# Patient Record
Sex: FEMALE | Race: WHITE | NOT HISPANIC OR LATINO
[De-identification: names, ages, dates, MRNs, and addresses within clinical notes are randomized per-mention and may not be internally consistent; named-entity substitution may affect disease eponyms.]

---

## 2019-09-24 ENCOUNTER — APPOINTMENT (OUTPATIENT)
Dept: OPHTHALMOLOGY | Facility: CLINIC | Age: 74
End: 2019-09-24
Payer: MEDICARE

## 2019-09-24 ENCOUNTER — NON-APPOINTMENT (OUTPATIENT)
Age: 74
End: 2019-09-24

## 2019-09-24 PROCEDURE — 92002 INTRM OPH EXAM NEW PATIENT: CPT

## 2019-09-24 PROCEDURE — 92012 INTRM OPH EXAM EST PATIENT: CPT

## 2020-03-03 ENCOUNTER — APPOINTMENT (OUTPATIENT)
Dept: OPHTHALMOLOGY | Facility: CLINIC | Age: 75
End: 2020-03-03
Payer: MEDICARE

## 2020-03-03 ENCOUNTER — NON-APPOINTMENT (OUTPATIENT)
Age: 75
End: 2020-03-03

## 2020-03-03 PROCEDURE — 92012 INTRM OPH EXAM EST PATIENT: CPT

## 2020-03-07 ENCOUNTER — TRANSCRIPTION ENCOUNTER (OUTPATIENT)
Age: 75
End: 2020-03-07

## 2020-08-21 PROBLEM — Z00.00 ENCOUNTER FOR PREVENTIVE HEALTH EXAMINATION: Status: ACTIVE | Noted: 2020-08-21

## 2020-08-25 ENCOUNTER — NON-APPOINTMENT (OUTPATIENT)
Age: 75
End: 2020-08-25

## 2020-08-25 ENCOUNTER — APPOINTMENT (OUTPATIENT)
Dept: OPHTHALMOLOGY | Facility: CLINIC | Age: 75
End: 2020-08-25
Payer: MEDICARE

## 2020-08-25 PROCEDURE — 92012 INTRM OPH EXAM EST PATIENT: CPT

## 2020-10-07 ENCOUNTER — APPOINTMENT (OUTPATIENT)
Dept: OPHTHALMOLOGY | Facility: CLINIC | Age: 75
End: 2020-10-07
Payer: MEDICARE

## 2020-10-07 ENCOUNTER — NON-APPOINTMENT (OUTPATIENT)
Age: 75
End: 2020-10-07

## 2020-10-07 PROCEDURE — 92014 COMPRE OPH EXAM EST PT 1/>: CPT

## 2020-11-22 ENCOUNTER — TRANSCRIPTION ENCOUNTER (OUTPATIENT)
Age: 75
End: 2020-11-22

## 2020-11-23 ENCOUNTER — OUTPATIENT (OUTPATIENT)
Dept: OUTPATIENT SERVICES | Facility: HOSPITAL | Age: 75
LOS: 1 days | Discharge: HOME | End: 2020-11-23
Payer: MEDICARE

## 2020-11-23 ENCOUNTER — APPOINTMENT (OUTPATIENT)
Dept: OPHTHALMOLOGY | Facility: AMBULATORY SURGERY CENTER | Age: 75
End: 2020-11-23

## 2020-11-23 ENCOUNTER — NON-APPOINTMENT (OUTPATIENT)
Age: 75
End: 2020-11-23

## 2020-11-23 PROCEDURE — 66984 XCAPSL CTRC RMVL W/O ECP: CPT | Mod: RT

## 2020-11-24 ENCOUNTER — NON-APPOINTMENT (OUTPATIENT)
Age: 75
End: 2020-11-24

## 2020-11-24 ENCOUNTER — APPOINTMENT (OUTPATIENT)
Dept: OPHTHALMOLOGY | Facility: CLINIC | Age: 75
End: 2020-11-24
Payer: MEDICARE

## 2020-11-24 PROCEDURE — 99024 POSTOP FOLLOW-UP VISIT: CPT

## 2020-12-01 ENCOUNTER — APPOINTMENT (OUTPATIENT)
Dept: OPHTHALMOLOGY | Facility: CLINIC | Age: 75
End: 2020-12-01
Payer: MEDICARE

## 2020-12-01 ENCOUNTER — NON-APPOINTMENT (OUTPATIENT)
Age: 75
End: 2020-12-01

## 2020-12-01 PROCEDURE — 99024 POSTOP FOLLOW-UP VISIT: CPT

## 2020-12-22 ENCOUNTER — APPOINTMENT (OUTPATIENT)
Dept: OPHTHALMOLOGY | Facility: CLINIC | Age: 75
End: 2020-12-22
Payer: MEDICARE

## 2020-12-22 ENCOUNTER — NON-APPOINTMENT (OUTPATIENT)
Age: 75
End: 2020-12-22

## 2020-12-22 PROCEDURE — 99024 POSTOP FOLLOW-UP VISIT: CPT

## 2021-02-10 ENCOUNTER — NON-APPOINTMENT (OUTPATIENT)
Age: 76
End: 2021-02-10

## 2021-02-10 ENCOUNTER — APPOINTMENT (OUTPATIENT)
Dept: OPHTHALMOLOGY | Facility: CLINIC | Age: 76
End: 2021-02-10
Payer: MEDICARE

## 2021-02-10 PROCEDURE — 99024 POSTOP FOLLOW-UP VISIT: CPT

## 2021-05-12 ENCOUNTER — APPOINTMENT (OUTPATIENT)
Dept: OPHTHALMOLOGY | Facility: CLINIC | Age: 76
End: 2021-05-12
Payer: MEDICARE

## 2021-05-12 ENCOUNTER — NON-APPOINTMENT (OUTPATIENT)
Age: 76
End: 2021-05-12

## 2021-05-12 PROCEDURE — 92012 INTRM OPH EXAM EST PATIENT: CPT

## 2021-09-28 ENCOUNTER — APPOINTMENT (OUTPATIENT)
Dept: OPHTHALMOLOGY | Facility: CLINIC | Age: 76
End: 2021-09-28
Payer: MEDICARE

## 2021-09-28 ENCOUNTER — NON-APPOINTMENT (OUTPATIENT)
Age: 76
End: 2021-09-28

## 2021-09-28 PROCEDURE — 92012 INTRM OPH EXAM EST PATIENT: CPT

## 2021-10-05 ENCOUNTER — APPOINTMENT (OUTPATIENT)
Dept: OPHTHALMOLOGY | Facility: CLINIC | Age: 76
End: 2021-10-05
Payer: MEDICARE

## 2021-10-05 ENCOUNTER — NON-APPOINTMENT (OUTPATIENT)
Age: 76
End: 2021-10-05

## 2021-10-05 PROCEDURE — 99212 OFFICE O/P EST SF 10 MIN: CPT

## 2021-10-12 ENCOUNTER — NON-APPOINTMENT (OUTPATIENT)
Age: 76
End: 2021-10-12

## 2021-10-12 ENCOUNTER — APPOINTMENT (OUTPATIENT)
Dept: OPHTHALMOLOGY | Facility: CLINIC | Age: 76
End: 2021-10-12
Payer: MEDICARE

## 2021-10-12 PROCEDURE — 92012 INTRM OPH EXAM EST PATIENT: CPT

## 2021-10-19 ENCOUNTER — NON-APPOINTMENT (OUTPATIENT)
Age: 76
End: 2021-10-19

## 2021-10-19 ENCOUNTER — APPOINTMENT (OUTPATIENT)
Dept: OPHTHALMOLOGY | Facility: CLINIC | Age: 76
End: 2021-10-19
Payer: MEDICARE

## 2021-10-19 PROCEDURE — 92012 INTRM OPH EXAM EST PATIENT: CPT

## 2021-10-27 ENCOUNTER — NON-APPOINTMENT (OUTPATIENT)
Age: 76
End: 2021-10-27

## 2021-10-27 ENCOUNTER — APPOINTMENT (OUTPATIENT)
Dept: OPHTHALMOLOGY | Facility: CLINIC | Age: 76
End: 2021-10-27
Payer: MEDICARE

## 2021-10-27 PROCEDURE — 92012 INTRM OPH EXAM EST PATIENT: CPT | Mod: 57

## 2021-10-27 PROCEDURE — 67028 INJECTION EYE DRUG: CPT | Mod: LT

## 2021-10-27 PROCEDURE — 76512 OPH US DX B-SCAN: CPT | Mod: LT

## 2021-10-27 PROCEDURE — 92012 INTRM OPH EXAM EST PATIENT: CPT | Mod: 25

## 2021-10-29 ENCOUNTER — NON-APPOINTMENT (OUTPATIENT)
Age: 76
End: 2021-10-29

## 2021-10-29 ENCOUNTER — APPOINTMENT (OUTPATIENT)
Dept: OPHTHALMOLOGY | Facility: CLINIC | Age: 76
End: 2021-10-29
Payer: MEDICARE

## 2021-10-29 ENCOUNTER — OUTPATIENT (OUTPATIENT)
Dept: OUTPATIENT SERVICES | Facility: HOSPITAL | Age: 76
LOS: 1 days | End: 2021-10-29

## 2021-10-29 DIAGNOSIS — Z00.00 ENCOUNTER FOR GENERAL ADULT MEDICAL EXAMINATION WITHOUT ABNORMAL FINDINGS: ICD-10-CM

## 2021-10-29 PROCEDURE — 92012 INTRM OPH EXAM EST PATIENT: CPT

## 2021-10-31 ENCOUNTER — TRANSCRIPTION ENCOUNTER (OUTPATIENT)
Age: 76
End: 2021-10-31

## 2021-11-01 ENCOUNTER — NON-APPOINTMENT (OUTPATIENT)
Age: 76
End: 2021-11-01

## 2021-11-01 ENCOUNTER — APPOINTMENT (OUTPATIENT)
Dept: OPHTHALMOLOGY | Facility: AMBULATORY SURGERY CENTER | Age: 76
End: 2021-11-01

## 2021-11-01 ENCOUNTER — RESULT REVIEW (OUTPATIENT)
Age: 76
End: 2021-11-01

## 2021-11-01 ENCOUNTER — OUTPATIENT (OUTPATIENT)
Dept: OUTPATIENT SERVICES | Facility: HOSPITAL | Age: 76
LOS: 1 days | Discharge: HOME | End: 2021-11-01
Payer: MEDICARE

## 2021-11-01 LAB
GRAM STN FLD: SIGNIFICANT CHANGE UP
GRAM STN FLD: SIGNIFICANT CHANGE UP
SPECIMEN SOURCE: SIGNIFICANT CHANGE UP
SPECIMEN SOURCE: SIGNIFICANT CHANGE UP

## 2021-11-01 PROCEDURE — 88304 TISSUE EXAM BY PATHOLOGIST: CPT | Mod: 26

## 2021-11-01 PROCEDURE — 67028 INJECTION EYE DRUG: CPT | Mod: LT

## 2021-11-01 PROCEDURE — 65756 CORNEAL TRNSPL ENDOTHELIAL: CPT | Mod: LT

## 2021-11-02 ENCOUNTER — APPOINTMENT (OUTPATIENT)
Dept: OPHTHALMOLOGY | Facility: CLINIC | Age: 76
End: 2021-11-02
Payer: MEDICARE

## 2021-11-02 ENCOUNTER — APPOINTMENT (OUTPATIENT)
Dept: OPHTHALMOLOGY | Facility: CLINIC | Age: 76
End: 2021-11-02

## 2021-11-02 ENCOUNTER — NON-APPOINTMENT (OUTPATIENT)
Age: 76
End: 2021-11-02

## 2021-11-02 PROCEDURE — 99024 POSTOP FOLLOW-UP VISIT: CPT

## 2021-11-03 ENCOUNTER — NON-APPOINTMENT (OUTPATIENT)
Age: 76
End: 2021-11-03

## 2021-11-03 ENCOUNTER — APPOINTMENT (OUTPATIENT)
Dept: OPHTHALMOLOGY | Facility: CLINIC | Age: 76
End: 2021-11-03
Payer: MEDICARE

## 2021-11-03 PROCEDURE — 99024 POSTOP FOLLOW-UP VISIT: CPT

## 2021-11-09 ENCOUNTER — APPOINTMENT (OUTPATIENT)
Dept: OPHTHALMOLOGY | Facility: CLINIC | Age: 76
End: 2021-11-09
Payer: MEDICARE

## 2021-11-09 ENCOUNTER — NON-APPOINTMENT (OUTPATIENT)
Age: 76
End: 2021-11-09

## 2021-11-09 PROCEDURE — 99024 POSTOP FOLLOW-UP VISIT: CPT

## 2021-11-10 ENCOUNTER — APPOINTMENT (OUTPATIENT)
Dept: OPHTHALMOLOGY | Facility: CLINIC | Age: 76
End: 2021-11-10

## 2021-11-15 LAB — SURGICAL PATHOLOGY STUDY: SIGNIFICANT CHANGE UP

## 2021-11-16 ENCOUNTER — NON-APPOINTMENT (OUTPATIENT)
Age: 76
End: 2021-11-16

## 2021-11-16 ENCOUNTER — APPOINTMENT (OUTPATIENT)
Dept: OPHTHALMOLOGY | Facility: CLINIC | Age: 76
End: 2021-11-16
Payer: MEDICARE

## 2021-11-16 PROCEDURE — 92285 EXTERNAL OCULAR PHOTOGRAPHY: CPT

## 2021-11-16 PROCEDURE — 99024 POSTOP FOLLOW-UP VISIT: CPT

## 2021-11-21 LAB
CULTURE RESULTS: NO GROWTH — SIGNIFICANT CHANGE UP
SPECIMEN SOURCE: SIGNIFICANT CHANGE UP

## 2021-11-22 LAB
CULTURE RESULTS: NO GROWTH — SIGNIFICANT CHANGE UP
SPECIMEN SOURCE: SIGNIFICANT CHANGE UP

## 2021-11-30 ENCOUNTER — NON-APPOINTMENT (OUTPATIENT)
Age: 76
End: 2021-11-30

## 2021-11-30 ENCOUNTER — APPOINTMENT (OUTPATIENT)
Dept: OPHTHALMOLOGY | Facility: CLINIC | Age: 76
End: 2021-11-30
Payer: MEDICARE

## 2021-11-30 PROCEDURE — 99024 POSTOP FOLLOW-UP VISIT: CPT

## 2021-12-14 ENCOUNTER — APPOINTMENT (OUTPATIENT)
Dept: OPHTHALMOLOGY | Facility: CLINIC | Age: 76
End: 2021-12-14
Payer: MEDICARE

## 2021-12-14 ENCOUNTER — NON-APPOINTMENT (OUTPATIENT)
Age: 76
End: 2021-12-14

## 2021-12-14 PROCEDURE — 99024 POSTOP FOLLOW-UP VISIT: CPT

## 2021-12-28 ENCOUNTER — APPOINTMENT (OUTPATIENT)
Dept: OPHTHALMOLOGY | Facility: CLINIC | Age: 76
End: 2021-12-28
Payer: MEDICARE

## 2021-12-28 ENCOUNTER — NON-APPOINTMENT (OUTPATIENT)
Age: 76
End: 2021-12-28

## 2021-12-28 PROCEDURE — 99024 POSTOP FOLLOW-UP VISIT: CPT

## 2022-01-18 ENCOUNTER — NON-APPOINTMENT (OUTPATIENT)
Age: 77
End: 2022-01-18

## 2022-01-18 ENCOUNTER — APPOINTMENT (OUTPATIENT)
Dept: OPHTHALMOLOGY | Facility: CLINIC | Age: 77
End: 2022-01-18
Payer: MEDICARE

## 2022-01-18 PROCEDURE — 99024 POSTOP FOLLOW-UP VISIT: CPT

## 2022-02-01 ENCOUNTER — APPOINTMENT (OUTPATIENT)
Dept: OPHTHALMOLOGY | Facility: CLINIC | Age: 77
End: 2022-02-01
Payer: MEDICARE

## 2022-02-01 ENCOUNTER — NON-APPOINTMENT (OUTPATIENT)
Age: 77
End: 2022-02-01

## 2022-02-01 PROCEDURE — 99212 OFFICE O/P EST SF 10 MIN: CPT

## 2022-02-16 ENCOUNTER — APPOINTMENT (OUTPATIENT)
Dept: OPHTHALMOLOGY | Facility: CLINIC | Age: 77
End: 2022-02-16
Payer: MEDICARE

## 2022-02-16 ENCOUNTER — NON-APPOINTMENT (OUTPATIENT)
Age: 77
End: 2022-02-16

## 2022-02-16 LAB
GRAM STN FLD: SIGNIFICANT CHANGE UP
SPECIMEN SOURCE: SIGNIFICANT CHANGE UP

## 2022-02-16 PROCEDURE — 65430 CORNEAL SMEAR: CPT | Mod: LT

## 2022-02-16 PROCEDURE — 92012 INTRM OPH EXAM EST PATIENT: CPT | Mod: 25

## 2022-02-17 ENCOUNTER — NON-APPOINTMENT (OUTPATIENT)
Age: 77
End: 2022-02-17

## 2022-02-17 ENCOUNTER — APPOINTMENT (OUTPATIENT)
Dept: OPHTHALMOLOGY | Facility: CLINIC | Age: 77
End: 2022-02-17
Payer: MEDICARE

## 2022-02-17 PROCEDURE — 92012 INTRM OPH EXAM EST PATIENT: CPT | Mod: 25

## 2022-02-17 PROCEDURE — 67820 REVISE EYELASHES: CPT | Mod: LT

## 2022-02-18 LAB
-  CEFTRIAXONE: SIGNIFICANT CHANGE UP
-  CLINDAMYCIN: SIGNIFICANT CHANGE UP
-  ERYTHROMYCIN: SIGNIFICANT CHANGE UP
-  LEVOFLOXACIN: SIGNIFICANT CHANGE UP
-  PENICILLIN: SIGNIFICANT CHANGE UP
-  VANCOMYCIN: SIGNIFICANT CHANGE UP
CULTURE RESULTS: SIGNIFICANT CHANGE UP
METHOD TYPE: SIGNIFICANT CHANGE UP
METHOD TYPE: SIGNIFICANT CHANGE UP
ORGANISM # SPEC MICROSCOPIC CNT: SIGNIFICANT CHANGE UP
SPECIMEN SOURCE: SIGNIFICANT CHANGE UP

## 2022-02-22 ENCOUNTER — APPOINTMENT (OUTPATIENT)
Dept: OPHTHALMOLOGY | Facility: CLINIC | Age: 77
End: 2022-02-22
Payer: MEDICARE

## 2022-02-22 ENCOUNTER — NON-APPOINTMENT (OUTPATIENT)
Age: 77
End: 2022-02-22

## 2022-02-22 PROCEDURE — 67028 INJECTION EYE DRUG: CPT | Mod: LT,59

## 2022-02-22 PROCEDURE — 92012 INTRM OPH EXAM EST PATIENT: CPT

## 2022-02-22 PROCEDURE — 65800 DRAINAGE OF EYE: CPT | Mod: LT

## 2022-02-23 ENCOUNTER — APPOINTMENT (OUTPATIENT)
Dept: OPHTHALMOLOGY | Facility: CLINIC | Age: 77
End: 2022-02-23
Payer: MEDICARE

## 2022-02-23 ENCOUNTER — NON-APPOINTMENT (OUTPATIENT)
Age: 77
End: 2022-02-23

## 2022-02-23 PROCEDURE — 92012 INTRM OPH EXAM EST PATIENT: CPT

## 2022-02-25 ENCOUNTER — APPOINTMENT (OUTPATIENT)
Dept: OPHTHALMOLOGY | Facility: CLINIC | Age: 77
End: 2022-02-25
Payer: MEDICARE

## 2022-02-25 ENCOUNTER — NON-APPOINTMENT (OUTPATIENT)
Age: 77
End: 2022-02-25

## 2022-02-25 PROCEDURE — 92012 INTRM OPH EXAM EST PATIENT: CPT

## 2022-03-01 ENCOUNTER — NON-APPOINTMENT (OUTPATIENT)
Age: 77
End: 2022-03-01

## 2022-03-01 ENCOUNTER — APPOINTMENT (OUTPATIENT)
Dept: OPHTHALMOLOGY | Facility: CLINIC | Age: 77
End: 2022-03-01
Payer: MEDICARE

## 2022-03-01 PROCEDURE — 92012 INTRM OPH EXAM EST PATIENT: CPT

## 2022-03-04 ENCOUNTER — NON-APPOINTMENT (OUTPATIENT)
Age: 77
End: 2022-03-04

## 2022-03-04 ENCOUNTER — APPOINTMENT (OUTPATIENT)
Dept: OPHTHALMOLOGY | Facility: CLINIC | Age: 77
End: 2022-03-04
Payer: MEDICARE

## 2022-03-04 PROCEDURE — 92012 INTRM OPH EXAM EST PATIENT: CPT

## 2022-03-08 ENCOUNTER — NON-APPOINTMENT (OUTPATIENT)
Age: 77
End: 2022-03-08

## 2022-03-08 ENCOUNTER — APPOINTMENT (OUTPATIENT)
Dept: OPHTHALMOLOGY | Facility: CLINIC | Age: 77
End: 2022-03-08
Payer: MEDICARE

## 2022-03-08 PROCEDURE — 92012 INTRM OPH EXAM EST PATIENT: CPT

## 2022-03-11 ENCOUNTER — APPOINTMENT (OUTPATIENT)
Dept: OPHTHALMOLOGY | Facility: CLINIC | Age: 77
End: 2022-03-11
Payer: MEDICARE

## 2022-03-11 ENCOUNTER — NON-APPOINTMENT (OUTPATIENT)
Age: 77
End: 2022-03-11

## 2022-03-11 PROCEDURE — 92012 INTRM OPH EXAM EST PATIENT: CPT

## 2022-03-15 ENCOUNTER — NON-APPOINTMENT (OUTPATIENT)
Age: 77
End: 2022-03-15

## 2022-03-15 ENCOUNTER — APPOINTMENT (OUTPATIENT)
Dept: OPHTHALMOLOGY | Facility: CLINIC | Age: 77
End: 2022-03-15
Payer: MEDICARE

## 2022-03-15 PROCEDURE — 92012 INTRM OPH EXAM EST PATIENT: CPT

## 2022-03-17 ENCOUNTER — APPOINTMENT (OUTPATIENT)
Dept: OPHTHALMOLOGY | Facility: CLINIC | Age: 77
End: 2022-03-17
Payer: MEDICARE

## 2022-03-17 ENCOUNTER — NON-APPOINTMENT (OUTPATIENT)
Age: 77
End: 2022-03-17

## 2022-03-17 ENCOUNTER — OUTPATIENT (OUTPATIENT)
Dept: OUTPATIENT SERVICES | Facility: HOSPITAL | Age: 77
LOS: 1 days | End: 2022-03-17

## 2022-03-17 DIAGNOSIS — Z00.00 ENCOUNTER FOR GENERAL ADULT MEDICAL EXAMINATION WITHOUT ABNORMAL FINDINGS: ICD-10-CM

## 2022-03-17 PROCEDURE — 92014 COMPRE OPH EXAM EST PT 1/>: CPT

## 2022-03-17 PROCEDURE — 92134 CPTRZ OPH DX IMG PST SGM RTA: CPT

## 2022-03-17 PROCEDURE — 76512 OPH US DX B-SCAN: CPT | Mod: LT

## 2022-03-18 NOTE — BRIEF OPERATIVE NOTE - NSICDXBRIEFPREOP_GEN_ALL_CORE_FT
PRE-OP DIAGNOSIS:  Endophthalmitis, left eye 18-Mar-2022 15:31:33  Cintia Pulido   PRE-OP DIAGNOSIS:  Endophthalmitis, left eye 18-Mar-2022 15:31:33  Cintia Pulido  Retained lens material, left 21-Mar-2022 16:16:04  Cintia Pulido

## 2022-03-18 NOTE — BRIEF OPERATIVE NOTE - OPERATION/FINDINGS
sommerings ring lens material found during case.  limited inflammation.  uncomplicated vitrectomy and lensectomy

## 2022-03-18 NOTE — BRIEF OPERATIVE NOTE - NSICDXBRIEFPROCEDURE_GEN_ALL_CORE_FT
PROCEDURES:  Left pars plana vitrectomy 18-Mar-2022 15:58:14  Cintia Pulido   PROCEDURES:  Left pars plana vitrectomy 18-Mar-2022 15:58:14  Cintia Pulido  Removal of lens material by phacofragmentation 21-Mar-2022 16:15:46  Cintia Pulido

## 2022-03-18 NOTE — BRIEF OPERATIVE NOTE - NSICDXBRIEFPOSTOP_GEN_ALL_CORE_FT
POST-OP DIAGNOSIS:  Endophthalmitis, left eye 18-Mar-2022 15:31:38  Cintia Pulido   POST-OP DIAGNOSIS:  Endophthalmitis, left eye 18-Mar-2022 15:31:38  Cintia Pulido  Retained lens material, left 21-Mar-2022 16:16:08  Cintia Pulido

## 2022-03-19 LAB
CULTURE RESULTS: SIGNIFICANT CHANGE UP
SPECIMEN SOURCE: SIGNIFICANT CHANGE UP

## 2022-03-20 ENCOUNTER — TRANSCRIPTION ENCOUNTER (OUTPATIENT)
Age: 77
End: 2022-03-20

## 2022-03-21 ENCOUNTER — RESULT REVIEW (OUTPATIENT)
Age: 77
End: 2022-03-21

## 2022-03-21 ENCOUNTER — NON-APPOINTMENT (OUTPATIENT)
Age: 77
End: 2022-03-21

## 2022-03-21 ENCOUNTER — OUTPATIENT (OUTPATIENT)
Dept: OUTPATIENT SERVICES | Facility: HOSPITAL | Age: 77
LOS: 1 days | Discharge: HOME | End: 2022-03-21
Payer: MEDICARE

## 2022-03-21 ENCOUNTER — APPOINTMENT (OUTPATIENT)
Dept: OPHTHALMOLOGY | Facility: AMBULATORY SURGERY CENTER | Age: 77
End: 2022-03-21
Payer: MEDICARE

## 2022-03-21 VITALS
OXYGEN SATURATION: 97 % | HEART RATE: 62 BPM | SYSTOLIC BLOOD PRESSURE: 111 MMHG | TEMPERATURE: 98 F | RESPIRATION RATE: 13 BRPM | DIASTOLIC BLOOD PRESSURE: 55 MMHG

## 2022-03-21 VITALS
RESPIRATION RATE: 16 BRPM | TEMPERATURE: 98 F | WEIGHT: 86.64 LBS | DIASTOLIC BLOOD PRESSURE: 54 MMHG | HEIGHT: 64 IN | HEART RATE: 61 BPM | SYSTOLIC BLOOD PRESSURE: 102 MMHG | OXYGEN SATURATION: 100 %

## 2022-03-21 DIAGNOSIS — R91.8 OTHER NONSPECIFIC ABNORMAL FINDING OF LUNG FIELD: Chronic | ICD-10-CM

## 2022-03-21 DIAGNOSIS — T86.8412 CORNEAL TRANSPLANT FAILURE, LEFT EYE: Chronic | ICD-10-CM

## 2022-03-21 DIAGNOSIS — Z98.890 OTHER SPECIFIED POSTPROCEDURAL STATES: Chronic | ICD-10-CM

## 2022-03-21 LAB
GRAM STN FLD: SIGNIFICANT CHANGE UP
SPECIMEN SOURCE: SIGNIFICANT CHANGE UP

## 2022-03-21 PROCEDURE — 67036 REMOVAL OF INNER EYE FLUID: CPT | Mod: LT

## 2022-03-21 PROCEDURE — 88300 SURGICAL PATH GROSS: CPT | Mod: 26,59

## 2022-03-21 PROCEDURE — 65920 REMOVE IMPLANT OF EYE: CPT | Mod: LT

## 2022-03-21 PROCEDURE — 65755 CORNEAL TRANSPLANT: CPT | Mod: LT

## 2022-03-21 PROCEDURE — 66850 REMOVAL OF LENS MATERIAL: CPT | Mod: LT

## 2022-03-21 PROCEDURE — 88312 SPECIAL STAINS GROUP 1: CPT | Mod: 26

## 2022-03-21 PROCEDURE — 88304 TISSUE EXAM BY PATHOLOGIST: CPT | Mod: 26

## 2022-03-21 DEVICE — LASER PROBE 25G CONSTELLATION: Type: IMPLANTABLE DEVICE | Status: FUNCTIONAL

## 2022-03-21 RX ORDER — CYCLOPENTOLATE HYDROCHLORIDE 10 MG/ML
1 SOLUTION/ DROPS OPHTHALMIC
Refills: 0 | Status: COMPLETED | OUTPATIENT
Start: 2022-03-21 | End: 2022-03-21

## 2022-03-21 RX ORDER — SODIUM CHLORIDE 9 MG/ML
1000 INJECTION, SOLUTION INTRAVENOUS
Refills: 0 | Status: DISCONTINUED | OUTPATIENT
Start: 2022-03-21 | End: 2022-03-21

## 2022-03-21 RX ORDER — OFLOXACIN 0.3 %
1 DROPS OPHTHALMIC (EYE)
Refills: 0 | Status: COMPLETED | OUTPATIENT
Start: 2022-03-21 | End: 2022-03-21

## 2022-03-21 RX ORDER — TROPICAMIDE 1 %
1 DROPS OPHTHALMIC (EYE)
Refills: 0 | Status: COMPLETED | OUTPATIENT
Start: 2022-03-21 | End: 2022-03-21

## 2022-03-21 RX ORDER — PHENYLEPHRINE HCL 2.5 %
1 DROPS OPHTHALMIC (EYE)
Refills: 0 | Status: COMPLETED | OUTPATIENT
Start: 2022-03-21 | End: 2022-03-21

## 2022-03-21 RX ADMIN — Medication 1 DROP(S): at 13:14

## 2022-03-21 RX ADMIN — CYCLOPENTOLATE HYDROCHLORIDE 1 DROP(S): 10 SOLUTION/ DROPS OPHTHALMIC at 13:05

## 2022-03-21 RX ADMIN — Medication 1 DROP(S): at 13:05

## 2022-03-21 RX ADMIN — Medication 1 DROP(S): at 13:11

## 2022-03-21 RX ADMIN — Medication 1 DROP(S): at 13:13

## 2022-03-21 RX ADMIN — CYCLOPENTOLATE HYDROCHLORIDE 1 DROP(S): 10 SOLUTION/ DROPS OPHTHALMIC at 13:14

## 2022-03-21 RX ADMIN — CYCLOPENTOLATE HYDROCHLORIDE 1 DROP(S): 10 SOLUTION/ DROPS OPHTHALMIC at 13:11

## 2022-03-21 NOTE — ASU PATIENT PROFILE, ADULT - NSICDXPASTSURGICALHX_GEN_ALL_CORE_FT
PAST SURGICAL HISTORY:  Corneal transplant failure, left eye     History of repair of hip joint     Lung mass

## 2022-03-21 NOTE — OPERATIVE REPORT - OPERATIVE RPOSRT DETAILS
PREOPERATIVE DIAGNOSIS:  ________infectious keratitis/endophthalmitis______ Left eye  POSTOPERATIVE DIAGNOSIS:  ______same______ Left eye  OPERATIVE PROCEDURE:  temporary keratoprosthesis placement, pars plana vitrectomy, Penetrating Keratoplasty; Left eye  IMPLANT: Donor #   [id number]                               Graft size:                 Host trephination size:    PROCEDURE:	  The patient was brought into the operating room and placed supine on the operating room table. After uneventful induction of neuroleptic anesthesia, a retrobulbar block consisting of 5-7  cc of 2% lidocaine and 0.75% marcaine was administered around the left eye.  A modified van Lint style lid block was also given.  The patient was prepped and draped in the usual sterile fashion. A wire lid speculum was inserted into the operative eye giving a wide palpebral fissure.  This procedure is going to be performed with an assistant surgeon whose extra hands are required to properly manage the surgery.     Using Castroviejo calipers the graft size was determined that would adequately excise pathologic corneal tissue avoiding limbal blood vessels. A new suction trephine was then centered over the cornea. Suction was attained and cut down continued until the anterior chamber was entered. The corneal button was then completely excised using a karely knife. Viscoelastic was then used to coat the lens iris diaphragm. A temporary keratoprosthesis was placed with 8x 6-0 silk sutures.     Pars plana vitrectomy was then performed and details will be described by retina.     A Fleuringa ring was centered around the corneoscleral limbus and secured to superficial sclera with four interrupted 8-0 vicryl sutures. Attention was then addressed to the donor cornea which was inspected and found to be suitable for use. The donor was placed endothelial size up on a suction punch block. A new disposable trephine was then used to fashion a donor corneal button of appropriate size. This was covered with corneal storage media and set aside for later use.     Attention was then readdressed to the recipient. The temporary keratoprosthesis was removed from the eye. The previously fashioned corneal donor button was then placed endothelial side down in the recipient bed. It was secured into position with 16 interrupted 10-0 nylon sutures. These sutures were knotted securely and the knots buried. The anterior chamber was maintained throughout the procedure using balanced salt solution. The Fleuringa ring was then removed from the eye. Separate conjunctival irrigations consisting of 4 mg dexamethasone and 100 mg cefazolin were administered inferiorly. The lid speculum was removed from the eye and a shield and dressing placed over the eye.  The patient tolerated the procedure well and went to the recovery area in good condition.     PREOPERATIVE DIAGNOSIS:  ________infectious keratitis/endophthalmitis______ Left eye  POSTOPERATIVE DIAGNOSIS:  ______same______ Left eye  OPERATIVE PROCEDURE:  temporary keratoprosthesis placement, pars plana vitrectomy, Penetrating Keratoplasty; Left eye  IMPLANT: Donor #   [id number]                               Graft size:                 Host trephination size:    PROCEDURE:	  The patient was brought into the operating room and placed supine on the operating room table. After uneventful induction of neuroleptic anesthesia, a retrobulbar block consisting of 5-7  cc of 2% lidocaine and 0.75% marcaine was administered around the left eye.  A modified van Lint style lid block was also given.  The patient was prepped and draped in the usual sterile fashion. A wire lid speculum was inserted into the operative eye giving a wide palpebral fissure.  This procedure is going to be performed with an assistant surgeon whose extra hands are required to properly manage the surgery.     Trocars were placed by the retina surgeon. Using Castroviejo calipers the graft size was determined that would adequately excise pathologic corneal tissue avoiding limbal blood vessels. A new suction trephine was then centered over the cornea. Suction was attained and cut down continued until the anterior chamber was entered. The corneal button was then completely excised using a karely knife. Viscoelastic was then used to coat the lens iris diaphragm. A temporary keratoprosthesis was placed with 8x 6-0 silk sutures.     Pars plana vitrectomy was then performed and details will be described by retina.     Attention was then addressed to the donor cornea which was inspected and found to be suitable for use. The donor was placed endothelial size up on a suction punch block. A new disposable trephine was then used to fashion a donor corneal button of appropriate size. This was covered with corneal storage media and set aside for later use.     Attention was then readdressed to the recipient. The temporary keratoprosthesis was removed from the eye. pupillary membrane and residual lens capsule were removed from the eye. The previously fashioned corneal donor button was then placed endothelial side down in the recipient bed. It was secured into position with 16 interrupted 10-0 nylon sutures. These sutures were knotted securely and the knots buried. The anterior chamber was maintained throughout the procedure using balanced salt solution. Intravitreal injection of voriconazole, vancomycin, cefazolin were given. The lid speculum was removed from the eye and a shield and dressing placed over the eye.  The patient tolerated the procedure well and went to the recovery area in good condition.

## 2022-03-22 ENCOUNTER — APPOINTMENT (OUTPATIENT)
Dept: OPHTHALMOLOGY | Facility: CLINIC | Age: 77
End: 2022-03-22
Payer: MEDICARE

## 2022-03-22 ENCOUNTER — NON-APPOINTMENT (OUTPATIENT)
Age: 77
End: 2022-03-22

## 2022-03-22 PROCEDURE — ZZZZZ: CPT

## 2022-03-22 PROCEDURE — 99024 POSTOP FOLLOW-UP VISIT: CPT

## 2022-03-23 ENCOUNTER — NON-APPOINTMENT (OUTPATIENT)
Age: 77
End: 2022-03-23

## 2022-03-23 ENCOUNTER — APPOINTMENT (OUTPATIENT)
Dept: OPHTHALMOLOGY | Facility: CLINIC | Age: 77
End: 2022-03-23
Payer: MEDICARE

## 2022-03-23 PROBLEM — L93.0 DISCOID LUPUS ERYTHEMATOSUS: Chronic | Status: ACTIVE | Noted: 2022-03-21

## 2022-03-23 PROBLEM — C34.90 MALIGNANT NEOPLASM OF UNSPECIFIED PART OF UNSPECIFIED BRONCHUS OR LUNG: Chronic | Status: ACTIVE | Noted: 2022-03-21

## 2022-03-23 PROBLEM — J43.9 EMPHYSEMA, UNSPECIFIED: Chronic | Status: ACTIVE | Noted: 2022-03-21

## 2022-03-23 PROCEDURE — 92071 CONTACT LENS FITTING FOR TX: CPT | Mod: LT

## 2022-03-23 PROCEDURE — 99024 POSTOP FOLLOW-UP VISIT: CPT

## 2022-03-30 ENCOUNTER — APPOINTMENT (OUTPATIENT)
Dept: OPHTHALMOLOGY | Facility: CLINIC | Age: 77
End: 2022-03-30
Payer: MEDICARE

## 2022-03-30 ENCOUNTER — NON-APPOINTMENT (OUTPATIENT)
Age: 77
End: 2022-03-30

## 2022-03-30 PROCEDURE — 99024 POSTOP FOLLOW-UP VISIT: CPT

## 2022-04-04 ENCOUNTER — NON-APPOINTMENT (OUTPATIENT)
Age: 77
End: 2022-04-04

## 2022-04-04 ENCOUNTER — APPOINTMENT (OUTPATIENT)
Dept: OPHTHALMOLOGY | Facility: CLINIC | Age: 77
End: 2022-04-04
Payer: MEDICARE

## 2022-04-04 PROCEDURE — 99215 OFFICE O/P EST HI 40 MIN: CPT | Mod: 24

## 2022-04-04 PROCEDURE — ZZZZZ: CPT

## 2022-04-06 ENCOUNTER — NON-APPOINTMENT (OUTPATIENT)
Age: 77
End: 2022-04-06

## 2022-04-06 ENCOUNTER — APPOINTMENT (OUTPATIENT)
Dept: OPHTHALMOLOGY | Facility: CLINIC | Age: 77
End: 2022-04-06
Payer: MEDICARE

## 2022-04-06 PROCEDURE — 99024 POSTOP FOLLOW-UP VISIT: CPT

## 2022-04-11 LAB
CULTURE RESULTS: NO GROWTH — SIGNIFICANT CHANGE UP
SPECIMEN SOURCE: SIGNIFICANT CHANGE UP

## 2022-04-12 ENCOUNTER — NON-APPOINTMENT (OUTPATIENT)
Age: 77
End: 2022-04-12

## 2022-04-12 ENCOUNTER — APPOINTMENT (OUTPATIENT)
Dept: OPHTHALMOLOGY | Facility: CLINIC | Age: 77
End: 2022-04-12
Payer: MEDICARE

## 2022-04-12 PROCEDURE — 99024 POSTOP FOLLOW-UP VISIT: CPT

## 2022-04-14 LAB — SURGICAL PATHOLOGY STUDY: SIGNIFICANT CHANGE UP

## 2022-04-19 ENCOUNTER — NON-APPOINTMENT (OUTPATIENT)
Age: 77
End: 2022-04-19

## 2022-04-19 ENCOUNTER — APPOINTMENT (OUTPATIENT)
Dept: OPHTHALMOLOGY | Facility: CLINIC | Age: 77
End: 2022-04-19
Payer: MEDICARE

## 2022-04-19 PROCEDURE — 92134 CPTRZ OPH DX IMG PST SGM RTA: CPT

## 2022-04-19 PROCEDURE — 99024 POSTOP FOLLOW-UP VISIT: CPT

## 2022-04-20 LAB
CULTURE RESULTS: SIGNIFICANT CHANGE UP
SPECIMEN SOURCE: SIGNIFICANT CHANGE UP

## 2022-04-26 ENCOUNTER — APPOINTMENT (OUTPATIENT)
Dept: OPHTHALMOLOGY | Facility: CLINIC | Age: 77
End: 2022-04-26
Payer: MEDICARE

## 2022-04-26 ENCOUNTER — NON-APPOINTMENT (OUTPATIENT)
Age: 77
End: 2022-04-26

## 2022-04-26 PROCEDURE — 99024 POSTOP FOLLOW-UP VISIT: CPT

## 2022-05-10 ENCOUNTER — APPOINTMENT (OUTPATIENT)
Dept: OPHTHALMOLOGY | Facility: CLINIC | Age: 77
End: 2022-05-10
Payer: MEDICARE

## 2022-05-10 ENCOUNTER — NON-APPOINTMENT (OUTPATIENT)
Age: 77
End: 2022-05-10

## 2022-05-10 PROCEDURE — 67028 INJECTION EYE DRUG: CPT | Mod: LT,59,78

## 2022-05-10 PROCEDURE — 92285 EXTERNAL OCULAR PHOTOGRAPHY: CPT

## 2022-05-10 PROCEDURE — 99024 POSTOP FOLLOW-UP VISIT: CPT

## 2022-05-10 PROCEDURE — 76512 OPH US DX B-SCAN: CPT | Mod: LT

## 2022-05-10 PROCEDURE — 67015 RELEASE OF EYE FLUID: CPT | Mod: LT,78

## 2022-05-13 NOTE — ASU PATIENT PROFILE, ADULT - FALL HARM RISK - UNIVERSAL INTERVENTIONS
Bed in lowest position, wheels locked, appropriate side rails in place/Call bell, personal items and telephone in reach/Instruct patient to call for assistance before getting out of bed or chair/Non-slip footwear when patient is out of bed/[place] to call system/Physically safe environment - no spills, clutter or unnecessary equipment/Purposeful Proactive Rounding/Room/bathroom lighting operational, light cord in reach

## 2022-05-15 ENCOUNTER — TRANSCRIPTION ENCOUNTER (OUTPATIENT)
Age: 77
End: 2022-05-15

## 2022-05-15 RX ORDER — PHENYLEPHRINE HCL 2.5 %
1 DROPS OPHTHALMIC (EYE)
Refills: 0 | Status: DISCONTINUED | OUTPATIENT
Start: 2022-05-16 | End: 2022-05-16

## 2022-05-16 ENCOUNTER — NON-APPOINTMENT (OUTPATIENT)
Age: 77
End: 2022-05-16

## 2022-05-16 ENCOUNTER — TRANSCRIPTION ENCOUNTER (OUTPATIENT)
Age: 77
End: 2022-05-16

## 2022-05-16 ENCOUNTER — RESULT REVIEW (OUTPATIENT)
Age: 77
End: 2022-05-16

## 2022-05-16 ENCOUNTER — APPOINTMENT (OUTPATIENT)
Dept: OPHTHALMOLOGY | Facility: AMBULATORY SURGERY CENTER | Age: 77
End: 2022-05-16
Payer: MEDICARE

## 2022-05-16 ENCOUNTER — OUTPATIENT (OUTPATIENT)
Dept: OUTPATIENT SERVICES | Facility: HOSPITAL | Age: 77
LOS: 1 days | Discharge: HOME | End: 2022-05-16
Payer: MEDICARE

## 2022-05-16 VITALS
TEMPERATURE: 98 F | DIASTOLIC BLOOD PRESSURE: 61 MMHG | HEART RATE: 59 BPM | RESPIRATION RATE: 16 BRPM | OXYGEN SATURATION: 99 % | SYSTOLIC BLOOD PRESSURE: 103 MMHG

## 2022-05-16 VITALS
WEIGHT: 86.2 LBS | DIASTOLIC BLOOD PRESSURE: 57 MMHG | HEART RATE: 54 BPM | HEIGHT: 64 IN | RESPIRATION RATE: 16 BRPM | SYSTOLIC BLOOD PRESSURE: 102 MMHG | TEMPERATURE: 99 F | OXYGEN SATURATION: 99 %

## 2022-05-16 DIAGNOSIS — R91.8 OTHER NONSPECIFIC ABNORMAL FINDING OF LUNG FIELD: Chronic | ICD-10-CM

## 2022-05-16 DIAGNOSIS — Z98.890 OTHER SPECIFIED POSTPROCEDURAL STATES: Chronic | ICD-10-CM

## 2022-05-16 DIAGNOSIS — T86.8412 CORNEAL TRANSPLANT FAILURE, LEFT EYE: Chronic | ICD-10-CM

## 2022-05-16 LAB
GRAM STN FLD: SIGNIFICANT CHANGE UP
SPECIMEN SOURCE: SIGNIFICANT CHANGE UP

## 2022-05-16 PROCEDURE — 67010 PARTIAL REMOVAL OF EYE FLUID: CPT | Mod: 79,LT

## 2022-05-16 PROCEDURE — 67028 INJECTION EYE DRUG: CPT | Mod: 79,LT

## 2022-05-16 PROCEDURE — 65755 CORNEAL TRANSPLANT: CPT | Mod: 79,LT

## 2022-05-16 PROCEDURE — 88304 TISSUE EXAM BY PATHOLOGIST: CPT | Mod: 26

## 2022-05-16 PROCEDURE — 67036 REMOVAL OF INNER EYE FLUID: CPT | Mod: LT,78

## 2022-05-16 RX ORDER — OFLOXACIN 0.3 %
1 DROPS OPHTHALMIC (EYE)
Refills: 0 | Status: COMPLETED | OUTPATIENT
Start: 2022-05-16 | End: 2022-05-16

## 2022-05-16 RX ORDER — TROPICAMIDE 1 %
1 DROPS OPHTHALMIC (EYE)
Refills: 0 | Status: COMPLETED | OUTPATIENT
Start: 2022-05-16 | End: 2022-05-16

## 2022-05-16 RX ORDER — CYCLOPENTOLATE HYDROCHLORIDE 10 MG/ML
1 SOLUTION/ DROPS OPHTHALMIC
Refills: 0 | Status: COMPLETED | OUTPATIENT
Start: 2022-05-16 | End: 2022-05-16

## 2022-05-16 RX ADMIN — Medication 1 DROP(S): at 13:01

## 2022-05-16 RX ADMIN — Medication 1 DROP(S): at 12:50

## 2022-05-16 RX ADMIN — Medication 1 DROP(S): at 13:03

## 2022-05-16 RX ADMIN — Medication 1 DROP(S): at 12:45

## 2022-05-16 RX ADMIN — CYCLOPENTOLATE HYDROCHLORIDE 1 DROP(S): 10 SOLUTION/ DROPS OPHTHALMIC at 12:57

## 2022-05-16 RX ADMIN — Medication 1 DROP(S): at 12:51

## 2022-05-16 RX ADMIN — CYCLOPENTOLATE HYDROCHLORIDE 1 DROP(S): 10 SOLUTION/ DROPS OPHTHALMIC at 13:02

## 2022-05-16 RX ADMIN — CYCLOPENTOLATE HYDROCHLORIDE 1 DROP(S): 10 SOLUTION/ DROPS OPHTHALMIC at 12:50

## 2022-05-16 RX ADMIN — Medication 1 DROP(S): at 12:44

## 2022-05-16 NOTE — ASU DISCHARGE PLAN (ADULT/PEDIATRIC) - NS MD DC FALL RISK RISK
For information on Fall & Injury Prevention, visit: [email or web address] OR  [email or web address] OR  https://www.cdc.gov/steadi/patient.html

## 2022-05-16 NOTE — OPERATIVE REPORT - OPERATIVE RPOSRT DETAILS
PREOPERATIVE DIAGNOSIS: Recurrent fungal keratitis, Left eye    POSTOPERATIVE DIAGNOSIS: Recurrent fungal keratitis, Left eye    OPERATIVE PROCEDURE:  Repeat Penetrating Keratoplasty, Pars Plana Vitrectomy, with Injection of Intravitreal Antibiotics/Antifungals, Left eye    IMPLANT: Donor #  [id number]                                Graft size:                 Host trephination size:    PROCEDURE:	  The patient was brought into the operating room and placed supine on the operating room table. After uneventful induction of neuroleptic anesthesia, a retrobulbar block consisting of 5-7  cc of 2% lidocaine and 0.75% marcaine was administered around the left eye.  A modified van Lint style lid block was also given.  The patient was prepped and draped in the usual sterile fashion. A wire lid speculum was inserted into the operative eye giving a wide palpebral fissure.  This procedure is going to be performed with an assistant surgeon whose extra hands are required to properly manage the surgery.   Using Castroviejo calipers the graft size was determined that would adequately excise pathologic corneal tissue avoiding limbal blood vessels. A Fleuringa ring was centered around the corneoscleral limbus and secured to superficial sclera with four interrupted 8-0 vicryl sutures. Attention was then addressed to the donor cornea which was inspected and found to be suitable for use. The donor was placed endothelial size up on a suction punch block. A new disposable trephine was then used to fashion a donor corneal button of appropriate size. This was covered with corneal storage media and set aside for later use.   Attention was then readdressed to the recipient. A new suction trephine was then centered over the cornea. Suction was attained and cut down continued until the anterior chamber was entered. The corneal button was then completely excised using a karely knife. If needed synechiolysis of any cornea iris adhesions was performed. Viscoelastic was then used to coat the lens iris diaphragm. The previously fashioned corneal donor button was then placed endothelial side down in the recipient bed. It was secured into position with 16 interrupted 10-0 nylon sutures. These sutures were knotted securely and the knots buried. The anterior chamber was maintained throughout the procedure using balanced salt solution. The Fleuringa ring was then removed from the eye. Separate conjunctival irrigations consisting of 4 mg dexamethasone and 100 mg cefazolin were administered inferiorly. The lid speculum was removed from the eye and a shield and dressing placed over the eye.  The patient tolerated the procedure well and went to the recovery area in good condition.     PREOPERATIVE DIAGNOSIS: Recurrent fungal keratitis, Left eye    POSTOPERATIVE DIAGNOSIS: Recurrent fungal keratitis, Left eye    OPERATIVE PROCEDURE:  Repeat Penetrating Keratoplasty, Pars Plana Vitrectomy, with Injection of Intravitreal Antibiotics/Antifungals, Left eye    IMPLANT: Donor #  [id number]                                Graft size:   8.5mm              Host trephination size: 8.75mm    PROCEDURE:	  The patient was brought into the operating room and placed supine on the operating room table. After uneventful induction of neuroleptic anesthesia, a retrobulbar block consisting of 5-7  cc of 2% lidocaine and 0.75% marcaine was administered around the left eye.  A modified van Lint style lid block was also given.  The patient was prepped and draped in the usual sterile fashion. A wire lid speculum was inserted into the operative eye giving a wide palpebral fissure.  This procedure is going to be performed with an assistant surgeon whose extra hands are required to properly manage the surgery.   Using Castroviejo calipers the graft size was determined that would adequately excise pathologic corneal tissue avoiding limbal blood vessels. A Fleuringa ring was centered around the corneoscleral limbus and secured to superficial sclera with four interrupted 8-0 vicryl sutures. Attention was then addressed to the donor cornea which was inspected and found to be suitable for use. The donor was placed endothelial size up on a suction punch block. A new disposable trephine was then used to fashion a donor corneal button of appropriate size. This was covered with corneal storage media and set aside for later use.   Attention was then readdressed to the recipient. A new suction trephine was then centered over the failed cornea graft. Suction was attained and cut down continued until the anterior chamber was entered. The corneal button was then completely excised using a karely knife. If needed synechiolysis of any cornea iris adhesions was performed and a inflammatory membrane was removed from the iris tissue. . Viscoelastic was then used to coat the lens iris diaphragm. The previously fashioned corneal donor button was then placed endothelial side down in the recipient bed. It was secured into position with 16 interrupted 10-0 nylon sutures. These sutures were knotted securely and the knots buried. The anterior chamber was maintained throughout the procedure using balanced salt solution. The Fleuringa ring was then removed from the eye. After we finished our portion of the case Dr. Fung came into the OR and began his portion of the case. He will dictate separately. At the completion of Dr. Henson surgery the following was done.   Separate conjunctival irrigations consisting of 4 mg dexamethasone and 100 mg cefazolin were administered inferiorly. The lid speculum was removed from the eye and a shield and dressing placed over the eye.  The patient tolerated the procedure well and went to the recovery area in good condition.

## 2022-05-17 ENCOUNTER — NON-APPOINTMENT (OUTPATIENT)
Age: 77
End: 2022-05-17

## 2022-05-17 ENCOUNTER — APPOINTMENT (OUTPATIENT)
Dept: OPHTHALMOLOGY | Facility: CLINIC | Age: 77
End: 2022-05-17
Payer: MEDICARE

## 2022-05-17 LAB
GRAM STN FLD: SIGNIFICANT CHANGE UP
SPECIMEN SOURCE: SIGNIFICANT CHANGE UP

## 2022-05-17 PROCEDURE — 99024 POSTOP FOLLOW-UP VISIT: CPT

## 2022-05-20 ENCOUNTER — APPOINTMENT (OUTPATIENT)
Dept: OPHTHALMOLOGY | Facility: CLINIC | Age: 77
End: 2022-05-20
Payer: MEDICARE

## 2022-05-20 ENCOUNTER — NON-APPOINTMENT (OUTPATIENT)
Age: 77
End: 2022-05-20

## 2022-05-20 PROCEDURE — 99024 POSTOP FOLLOW-UP VISIT: CPT

## 2022-05-21 LAB — SURGICAL PATHOLOGY STUDY: SIGNIFICANT CHANGE UP

## 2022-05-24 ENCOUNTER — NON-APPOINTMENT (OUTPATIENT)
Age: 77
End: 2022-05-24

## 2022-05-24 ENCOUNTER — APPOINTMENT (OUTPATIENT)
Dept: OPHTHALMOLOGY | Facility: CLINIC | Age: 77
End: 2022-05-24
Payer: MEDICARE

## 2022-05-24 PROCEDURE — 67028 INJECTION EYE DRUG: CPT | Mod: LT,59,79

## 2022-05-24 PROCEDURE — 99024 POSTOP FOLLOW-UP VISIT: CPT

## 2022-05-24 PROCEDURE — 67015 RELEASE OF EYE FLUID: CPT | Mod: LT,79

## 2022-05-31 ENCOUNTER — APPOINTMENT (OUTPATIENT)
Dept: OPHTHALMOLOGY | Facility: CLINIC | Age: 77
End: 2022-05-31
Payer: MEDICARE

## 2022-05-31 ENCOUNTER — NON-APPOINTMENT (OUTPATIENT)
Age: 77
End: 2022-05-31

## 2022-05-31 PROCEDURE — 66020 INJECTION TREATMENT OF EYE: CPT | Mod: 78,LT

## 2022-05-31 PROCEDURE — 99024 POSTOP FOLLOW-UP VISIT: CPT

## 2022-06-03 ENCOUNTER — APPOINTMENT (OUTPATIENT)
Dept: OPHTHALMOLOGY | Facility: CLINIC | Age: 77
End: 2022-06-03
Payer: MEDICARE

## 2022-06-03 ENCOUNTER — NON-APPOINTMENT (OUTPATIENT)
Age: 77
End: 2022-06-03

## 2022-06-03 PROCEDURE — 99024 POSTOP FOLLOW-UP VISIT: CPT

## 2022-06-06 LAB
CULTURE RESULTS: NO GROWTH — SIGNIFICANT CHANGE UP
SPECIMEN SOURCE: SIGNIFICANT CHANGE UP

## 2022-06-07 ENCOUNTER — APPOINTMENT (OUTPATIENT)
Dept: OPHTHALMOLOGY | Facility: CLINIC | Age: 77
End: 2022-06-07
Payer: MEDICARE

## 2022-06-07 ENCOUNTER — NON-APPOINTMENT (OUTPATIENT)
Age: 77
End: 2022-06-07

## 2022-06-07 LAB
CULTURE RESULTS: NO GROWTH — SIGNIFICANT CHANGE UP
SPECIMEN SOURCE: SIGNIFICANT CHANGE UP

## 2022-06-07 PROCEDURE — 92285 EXTERNAL OCULAR PHOTOGRAPHY: CPT

## 2022-06-07 PROCEDURE — 99024 POSTOP FOLLOW-UP VISIT: CPT

## 2022-06-10 ENCOUNTER — NON-APPOINTMENT (OUTPATIENT)
Age: 77
End: 2022-06-10

## 2022-06-10 ENCOUNTER — APPOINTMENT (OUTPATIENT)
Dept: OPHTHALMOLOGY | Facility: CLINIC | Age: 77
End: 2022-06-10
Payer: MEDICARE

## 2022-06-10 PROCEDURE — 66020 INJECTION TREATMENT OF EYE: CPT | Mod: 79,LT

## 2022-06-10 PROCEDURE — 99024 POSTOP FOLLOW-UP VISIT: CPT

## 2022-06-14 ENCOUNTER — NON-APPOINTMENT (OUTPATIENT)
Age: 77
End: 2022-06-14

## 2022-06-14 ENCOUNTER — APPOINTMENT (OUTPATIENT)
Dept: OPHTHALMOLOGY | Facility: CLINIC | Age: 77
End: 2022-06-14
Payer: MEDICARE

## 2022-06-14 PROCEDURE — 66020 INJECTION TREATMENT OF EYE: CPT | Mod: 79,LT

## 2022-06-14 PROCEDURE — 99024 POSTOP FOLLOW-UP VISIT: CPT

## 2022-06-17 ENCOUNTER — NON-APPOINTMENT (OUTPATIENT)
Age: 77
End: 2022-06-17

## 2022-06-17 ENCOUNTER — APPOINTMENT (OUTPATIENT)
Dept: OPHTHALMOLOGY | Facility: CLINIC | Age: 77
End: 2022-06-17
Payer: MEDICARE

## 2022-06-17 PROCEDURE — 66020 INJECTION TREATMENT OF EYE: CPT | Mod: 79,LT

## 2022-06-21 ENCOUNTER — APPOINTMENT (OUTPATIENT)
Dept: OPHTHALMOLOGY | Facility: CLINIC | Age: 77
End: 2022-06-21
Payer: MEDICARE

## 2022-06-21 ENCOUNTER — NON-APPOINTMENT (OUTPATIENT)
Age: 77
End: 2022-06-21

## 2022-06-21 PROCEDURE — 99024 POSTOP FOLLOW-UP VISIT: CPT

## 2022-06-24 ENCOUNTER — APPOINTMENT (OUTPATIENT)
Dept: OPHTHALMOLOGY | Facility: CLINIC | Age: 77
End: 2022-06-24
Payer: MEDICARE

## 2022-06-24 ENCOUNTER — NON-APPOINTMENT (OUTPATIENT)
Age: 77
End: 2022-06-24

## 2022-06-24 PROCEDURE — 99024 POSTOP FOLLOW-UP VISIT: CPT

## 2022-06-27 ENCOUNTER — APPOINTMENT (OUTPATIENT)
Dept: OPHTHALMOLOGY | Facility: CLINIC | Age: 77
End: 2022-06-27

## 2022-06-27 ENCOUNTER — NON-APPOINTMENT (OUTPATIENT)
Age: 77
End: 2022-06-27

## 2022-06-27 PROCEDURE — 76512 OPH US DX B-SCAN: CPT | Mod: LT

## 2022-06-27 PROCEDURE — 99024 POSTOP FOLLOW-UP VISIT: CPT

## 2022-06-27 PROCEDURE — 67028 INJECTION EYE DRUG: CPT | Mod: LT,78

## 2022-06-28 ENCOUNTER — APPOINTMENT (OUTPATIENT)
Dept: OPHTHALMOLOGY | Facility: CLINIC | Age: 77
End: 2022-06-28

## 2022-06-28 ENCOUNTER — NON-APPOINTMENT (OUTPATIENT)
Age: 77
End: 2022-06-28

## 2022-06-28 PROCEDURE — 99024 POSTOP FOLLOW-UP VISIT: CPT

## 2022-06-28 PROCEDURE — 76512 OPH US DX B-SCAN: CPT | Mod: LT

## 2022-06-29 ENCOUNTER — APPOINTMENT (OUTPATIENT)
Dept: OPHTHALMOLOGY | Facility: CLINIC | Age: 77
End: 2022-06-29

## 2022-06-29 ENCOUNTER — NON-APPOINTMENT (OUTPATIENT)
Age: 77
End: 2022-06-29

## 2022-06-29 PROCEDURE — 99024 POSTOP FOLLOW-UP VISIT: CPT

## 2022-07-01 ENCOUNTER — NON-APPOINTMENT (OUTPATIENT)
Age: 77
End: 2022-07-01

## 2022-07-01 ENCOUNTER — APPOINTMENT (OUTPATIENT)
Dept: OPHTHALMOLOGY | Facility: CLINIC | Age: 77
End: 2022-07-01

## 2022-07-01 PROCEDURE — 66020 INJECTION TREATMENT OF EYE: CPT | Mod: LT,78

## 2022-07-01 PROCEDURE — 99024 POSTOP FOLLOW-UP VISIT: CPT

## 2022-07-01 NOTE — ASU PATIENT PROFILE, ADULT - ABLE TO REACH PT
Voicemail Instruction: Arrival time of 10am; No solid food, dairy, candy or gum after midnight Monday, water is allowed before 9:00AM Tuesday, bring photo ID, vaccination and insurance, credit card or check for co-pay; no jewelries; dress in comfortable clothes; no smoking, illicit drug use, or alcohol drinking Monday;  Escort must show proof of vaccination or negative covid test result and Photo ID; address and call back number provided to patient/no

## 2022-07-04 ENCOUNTER — TRANSCRIPTION ENCOUNTER (OUTPATIENT)
Age: 77
End: 2022-07-04

## 2022-07-05 ENCOUNTER — APPOINTMENT (OUTPATIENT)
Dept: OPHTHALMOLOGY | Facility: AMBULATORY SURGERY CENTER | Age: 77
End: 2022-07-05

## 2022-07-05 ENCOUNTER — NON-APPOINTMENT (OUTPATIENT)
Age: 77
End: 2022-07-05

## 2022-07-05 ENCOUNTER — TRANSCRIPTION ENCOUNTER (OUTPATIENT)
Age: 77
End: 2022-07-05

## 2022-07-05 ENCOUNTER — OUTPATIENT (OUTPATIENT)
Dept: OUTPATIENT SERVICES | Facility: HOSPITAL | Age: 77
LOS: 1 days | Discharge: HOME | End: 2022-07-05

## 2022-07-05 ENCOUNTER — RESULT REVIEW (OUTPATIENT)
Age: 77
End: 2022-07-05

## 2022-07-05 VITALS
HEART RATE: 73 BPM | HEIGHT: 64 IN | DIASTOLIC BLOOD PRESSURE: 64 MMHG | SYSTOLIC BLOOD PRESSURE: 103 MMHG | OXYGEN SATURATION: 98 % | RESPIRATION RATE: 16 BRPM | WEIGHT: 87.08 LBS | TEMPERATURE: 99 F

## 2022-07-05 VITALS
DIASTOLIC BLOOD PRESSURE: 56 MMHG | RESPIRATION RATE: 16 BRPM | OXYGEN SATURATION: 97 % | HEART RATE: 72 BPM | SYSTOLIC BLOOD PRESSURE: 88 MMHG

## 2022-07-05 DIAGNOSIS — T86.8412 CORNEAL TRANSPLANT FAILURE, LEFT EYE: Chronic | ICD-10-CM

## 2022-07-05 DIAGNOSIS — Z98.890 OTHER SPECIFIED POSTPROCEDURAL STATES: Chronic | ICD-10-CM

## 2022-07-05 DIAGNOSIS — R91.8 OTHER NONSPECIFIC ABNORMAL FINDING OF LUNG FIELD: Chronic | ICD-10-CM

## 2022-07-05 LAB
GRAM STN FLD: SIGNIFICANT CHANGE UP
SPECIMEN SOURCE: SIGNIFICANT CHANGE UP

## 2022-07-05 PROCEDURE — 67036 REMOVAL OF INNER EYE FLUID: CPT | Mod: LT,78

## 2022-07-05 RX ORDER — CYCLOPENTOLATE HYDROCHLORIDE 10 MG/ML
1 SOLUTION/ DROPS OPHTHALMIC
Refills: 0 | Status: COMPLETED | OUTPATIENT
Start: 2022-07-05 | End: 2022-07-05

## 2022-07-05 RX ORDER — OFLOXACIN 0.3 %
1 DROPS OPHTHALMIC (EYE)
Refills: 0 | Status: COMPLETED | OUTPATIENT
Start: 2022-07-05 | End: 2022-07-05

## 2022-07-05 RX ORDER — PHENYLEPHRINE HCL 2.5 %
1 DROPS OPHTHALMIC (EYE)
Refills: 0 | Status: COMPLETED | OUTPATIENT
Start: 2022-07-05 | End: 2022-07-05

## 2022-07-05 RX ORDER — SODIUM CHLORIDE 9 MG/ML
1000 INJECTION, SOLUTION INTRAVENOUS
Refills: 0 | Status: DISCONTINUED | OUTPATIENT
Start: 2022-07-05 | End: 2022-07-05

## 2022-07-05 RX ORDER — RISEDRONATE SODIUM 25.8; 4.2 MG/1; MG/1
1 TABLET, FILM COATED ORAL
Qty: 0 | Refills: 0 | DISCHARGE

## 2022-07-05 RX ORDER — OXYCODONE HYDROCHLORIDE 5 MG/1
5 TABLET ORAL ONCE
Refills: 0 | Status: DISCONTINUED | OUTPATIENT
Start: 2022-07-05 | End: 2022-07-05

## 2022-07-05 RX ORDER — RALOXIFENE HYDROCHLORIDE 60 MG/1
1 TABLET, COATED ORAL
Qty: 0 | Refills: 0 | DISCHARGE

## 2022-07-05 RX ORDER — ACETAMINOPHEN 500 MG
650 TABLET ORAL EVERY 6 HOURS
Refills: 0 | Status: DISCONTINUED | OUTPATIENT
Start: 2022-07-05 | End: 2022-07-05

## 2022-07-05 RX ORDER — ONDANSETRON 8 MG/1
4 TABLET, FILM COATED ORAL ONCE
Refills: 0 | Status: DISCONTINUED | OUTPATIENT
Start: 2022-07-05 | End: 2022-07-05

## 2022-07-05 RX ORDER — TROPICAMIDE 1 %
1 DROPS OPHTHALMIC (EYE)
Refills: 0 | Status: COMPLETED | OUTPATIENT
Start: 2022-07-05 | End: 2022-07-05

## 2022-07-05 RX ADMIN — Medication 1 DROP(S): at 10:39

## 2022-07-05 RX ADMIN — Medication 1 DROP(S): at 12:20

## 2022-07-05 RX ADMIN — CYCLOPENTOLATE HYDROCHLORIDE 1 DROP(S): 10 SOLUTION/ DROPS OPHTHALMIC at 10:39

## 2022-07-05 RX ADMIN — Medication 1 DROP(S): at 11:50

## 2022-07-05 RX ADMIN — Medication 1 DROP(S): at 11:20

## 2022-07-05 RX ADMIN — CYCLOPENTOLATE HYDROCHLORIDE 1 DROP(S): 10 SOLUTION/ DROPS OPHTHALMIC at 11:50

## 2022-07-05 RX ADMIN — CYCLOPENTOLATE HYDROCHLORIDE 1 DROP(S): 10 SOLUTION/ DROPS OPHTHALMIC at 12:20

## 2022-07-05 RX ADMIN — Medication 1 DROP(S): at 12:50

## 2022-07-05 RX ADMIN — CYCLOPENTOLATE HYDROCHLORIDE 1 DROP(S): 10 SOLUTION/ DROPS OPHTHALMIC at 10:44

## 2022-07-05 RX ADMIN — Medication 1 DROP(S): at 10:50

## 2022-07-05 RX ADMIN — Medication 1 DROP(S): at 10:45

## 2022-07-05 RX ADMIN — CYCLOPENTOLATE HYDROCHLORIDE 1 DROP(S): 10 SOLUTION/ DROPS OPHTHALMIC at 12:50

## 2022-07-05 RX ADMIN — Medication 1 DROP(S): at 12:51

## 2022-07-05 RX ADMIN — Medication 1 DROP(S): at 10:49

## 2022-07-05 RX ADMIN — Medication 1 DROP(S): at 10:38

## 2022-07-05 RX ADMIN — CYCLOPENTOLATE HYDROCHLORIDE 1 DROP(S): 10 SOLUTION/ DROPS OPHTHALMIC at 11:20

## 2022-07-05 RX ADMIN — CYCLOPENTOLATE HYDROCHLORIDE 1 DROP(S): 10 SOLUTION/ DROPS OPHTHALMIC at 10:49

## 2022-07-05 NOTE — ANESTHESIA FOLLOW-UP NOTE - NSEVALATIONFT_GEN_ALL_CORE
Patient sat down on the foot extension of the chair instead of the chair and sustained a fall from the foot hdez to the floor. She denies any pain of injury aside from a small bruise to the right elbow without any limitation in the ROM. No hematoma, just small area of redness, no swelling. Patient denies any dizziness, LOC or injury to any other area. Discussion with patient to please ask for assistance if she needs help in and out of the chair. Patient reports she has no pain or any other complaints at this time. A + O x 3,  neurovascular intact

## 2022-07-05 NOTE — OPERATIVE REPORT - OPERATIVE RPOSRT DETAILS
PATIENT:  KEI HANSEN	    ACCOUNT NUMBER: [id number]     OPERATING SURGEON:  Dr. Bao Juárez    ASSISTANT SURGEON:  [  Dr. Cande Morataya  ]    DATE OF SURGERY:  [  7/5/22  ]	    ANESTHESIA:  MAC/Retrobulbar		    COMPLICATIONS: [  none  ]	    ESTIMATED BLOOD LOSS: < 1mL	    PREOPERATIVE DIAGNOSIS:  _____infectious keratitis______ Left eye  POSTOPERATIVE DIAGNOSIS:  ________same_____ Left eye  OPERATIVE PROCEDURE:  Penetrating Keratoplasty, Left eye  IMPLANT: Donor #                                  Graft size:                 Host trephination size:    PROCEDURE:	  The patient was brought into the operating room and placed supine on the operating room table. After uneventful induction of neuroleptic anesthesia, a retrobulbar block consisting of 5-7  cc of 2% lidocaine and 0.75% marcaine was administered around the left eye.  A modified van Lint style lid block was also given.  The patient was prepped and draped in the usual sterile fashion. A wire lid speculum was inserted into the operative eye giving a wide palpebral fissure.  This procedure is going to be performed with an assistant surgeon whose extra hands are required to properly manage the surgery.   Using Castroviejo calipers the graft size was determined that would adequately excise pathologic corneal tissue avoiding limbal blood vessels. A Fleuringa ring was centered around the corneoscleral limbus and secured to superficial sclera with four interrupted 8-0 vicryl sutures. Attention was then addressed to the donor cornea which was inspected and found to be suitable for use. The donor was placed endothelial size up on a suction punch block. A new disposable trephine was then used to fashion a donor corneal button of appropriate size. This was covered with corneal storage media and set aside for later use.   Attention was then readdressed to the recipient. A new suction trephine was then centered over the cornea. Suction was attained and cut down continued until the anterior chamber was entered. The corneal button was then completely excised using a karely knife. If needed synechiolysis of any cornea iris adhesions was performed. Viscoelastic was then used to coat the lens iris diaphragm. The previously fashioned corneal donor button was then placed endothelial side down in the recipient bed. It was secured into position with 16 interrupted 10-0 nylon sutures. These sutures were knotted securely and the knots buried. The anterior chamber was maintained throughout the procedure using balanced salt solution. The Fleuringa ring was then removed from the eye. Separate conjunctival irrigations consisting of 4 mg dexamethasone and 100 mg cefazolin were administered inferiorly. The lid speculum was removed from the eye and a shield and dressing placed over the eye.  The patient tolerated the procedure well and went to the recovery area in good condition.     PATIENT:  KEI HANSEN	    ACCOUNT NUMBER: [id number]     OPERATING SURGEON:  Dr. Bao Juárez    ASSISTANT SURGEON:  [  Dr. Cande Morataya  ]    DATE OF SURGERY:  [  7/5/22  ]	    ANESTHESIA:  MAC/Retrobulbar		    COMPLICATIONS: [  none  ]	    ESTIMATED BLOOD LOSS: < 1mL	    PREOPERATIVE DIAGNOSIS:  _____presumed fungal keratitis, endophthalmitis______ Left eye  POSTOPERATIVE DIAGNOSIS:  ________same_____ Left eye  OPERATIVE PROCEDURE:  Penetrating Keratoplasty, pars plana vitrectomy, injection of antifungal and antibioticmeidcines.Left eye  IMPLANT: Donor #[id number]                                  Graft size:  8.75mm               Host trephination size: 8.50 mm    PROCEDURE:	  The patient was brought into the operating room and placed supine on the operating room table. After uneventful induction of neuroleptic anesthesia, a retrobulbar block consisting of 5-7  cc of 2% lidocaine and 0.75% marcaine was administered around the left eye.  A modified van Lint style lid block was also given.  The patient was prepped and draped in the usual sterile fashion. A wire lid speculum was inserted into the operative eye giving a wide palpebral fissure.  This procedure is going to be performed with an assistant surgeon whose extra hands are required to properly manage the surgery.   Using Castroviejo calipers the graft size was determined that would adequately excise pathologic corneal tissue avoiding limbal blood vessels. A Fleuringa ring was centered around the corneoscleral limbus and secured to superficial sclera with four interrupted 8-0 vicryl sutures. Attention was then addressed to the donor cornea which was inspected and found to be suitable for use. The donor was placed endothelial size up on a suction punch block. A new disposable trephine was then used to fashion a donor corneal button of appropriate size. This was covered with corneal storage media and set aside for later use.   Attention was then readdressed to the recipient. A new suction trephine was then centered over the cornea. Suction was attained and cut down continued until the anterior chamber was entered. The corneal button was then completely excised using a karely knife. If needed synechiolysis of any cornea iris adhesions was performed. Viscoelastic was then used to coat the lens iris diaphragm. The previously fashioned corneal donor button was then placed endothelial side down in the recipient bed. It was secured into position with 16 interrupted 10-0 nylon sutures. These sutures were knotted securely and the knots buried. The anterior chamber was maintained throughout the procedure using balanced salt solution. The Fleuringa ring was then removed from the eye.    At this time a pars plana vitrectomy and injections of antifungal and antimicrobial medicines were performed by a Dr. Fung. This portion will be described in a separate operative report.

## 2022-07-06 ENCOUNTER — APPOINTMENT (OUTPATIENT)
Dept: OPHTHALMOLOGY | Facility: CLINIC | Age: 77
End: 2022-07-06

## 2022-07-06 ENCOUNTER — NON-APPOINTMENT (OUTPATIENT)
Age: 77
End: 2022-07-06

## 2022-07-06 PROCEDURE — 99024 POSTOP FOLLOW-UP VISIT: CPT

## 2022-07-08 ENCOUNTER — NON-APPOINTMENT (OUTPATIENT)
Age: 77
End: 2022-07-08

## 2022-07-08 ENCOUNTER — APPOINTMENT (OUTPATIENT)
Dept: OPHTHALMOLOGY | Facility: CLINIC | Age: 77
End: 2022-07-08

## 2022-07-08 PROCEDURE — 99024 POSTOP FOLLOW-UP VISIT: CPT

## 2022-07-12 ENCOUNTER — NON-APPOINTMENT (OUTPATIENT)
Age: 77
End: 2022-07-12

## 2022-07-12 ENCOUNTER — APPOINTMENT (OUTPATIENT)
Dept: OPHTHALMOLOGY | Facility: CLINIC | Age: 77
End: 2022-07-12

## 2022-07-12 PROCEDURE — 99024 POSTOP FOLLOW-UP VISIT: CPT

## 2022-07-12 PROCEDURE — 66020 INJECTION TREATMENT OF EYE: CPT | Mod: 78,LT

## 2022-07-12 PROCEDURE — 76512 OPH US DX B-SCAN: CPT | Mod: LT

## 2022-07-14 ENCOUNTER — APPOINTMENT (OUTPATIENT)
Dept: OPHTHALMOLOGY | Facility: CLINIC | Age: 77
End: 2022-07-14

## 2022-07-14 ENCOUNTER — NON-APPOINTMENT (OUTPATIENT)
Age: 77
End: 2022-07-14

## 2022-07-14 PROCEDURE — 92012 INTRM OPH EXAM EST PATIENT: CPT | Mod: 24

## 2022-07-18 ENCOUNTER — NON-APPOINTMENT (OUTPATIENT)
Age: 77
End: 2022-07-18

## 2022-07-18 ENCOUNTER — APPOINTMENT (OUTPATIENT)
Dept: OPHTHALMOLOGY | Facility: CLINIC | Age: 77
End: 2022-07-18

## 2022-07-18 PROCEDURE — 92071 CONTACT LENS FITTING FOR TX: CPT | Mod: LT

## 2022-07-18 PROCEDURE — 99024 POSTOP FOLLOW-UP VISIT: CPT

## 2022-07-18 PROCEDURE — 66020 INJECTION TREATMENT OF EYE: CPT | Mod: 78,LT

## 2022-07-22 ENCOUNTER — NON-APPOINTMENT (OUTPATIENT)
Age: 77
End: 2022-07-22

## 2022-07-22 ENCOUNTER — APPOINTMENT (OUTPATIENT)
Dept: OPHTHALMOLOGY | Facility: CLINIC | Age: 77
End: 2022-07-22

## 2022-07-22 PROCEDURE — 66020 INJECTION TREATMENT OF EYE: CPT | Mod: 78,LT

## 2022-07-22 PROCEDURE — 92071 CONTACT LENS FITTING FOR TX: CPT | Mod: LT

## 2022-07-22 PROCEDURE — 99024 POSTOP FOLLOW-UP VISIT: CPT

## 2022-07-25 ENCOUNTER — NON-APPOINTMENT (OUTPATIENT)
Age: 77
End: 2022-07-25

## 2022-07-25 ENCOUNTER — APPOINTMENT (OUTPATIENT)
Dept: OPHTHALMOLOGY | Facility: CLINIC | Age: 77
End: 2022-07-25

## 2022-07-25 PROCEDURE — 92012 INTRM OPH EXAM EST PATIENT: CPT

## 2022-07-26 LAB
CULTURE RESULTS: NO GROWTH — SIGNIFICANT CHANGE UP
SPECIMEN SOURCE: SIGNIFICANT CHANGE UP

## 2022-07-29 ENCOUNTER — NON-APPOINTMENT (OUTPATIENT)
Age: 77
End: 2022-07-29

## 2022-07-29 ENCOUNTER — APPOINTMENT (OUTPATIENT)
Dept: OPHTHALMOLOGY | Facility: CLINIC | Age: 77
End: 2022-07-29

## 2022-07-29 PROCEDURE — 99024 POSTOP FOLLOW-UP VISIT: CPT

## 2022-07-29 PROCEDURE — 66020 INJECTION TREATMENT OF EYE: CPT | Mod: 58,LT

## 2022-08-02 ENCOUNTER — APPOINTMENT (OUTPATIENT)
Dept: OPHTHALMOLOGY | Facility: CLINIC | Age: 77
End: 2022-08-02

## 2022-08-02 ENCOUNTER — NON-APPOINTMENT (OUTPATIENT)
Age: 77
End: 2022-08-02

## 2022-08-02 PROCEDURE — 99024 POSTOP FOLLOW-UP VISIT: CPT

## 2022-08-05 ENCOUNTER — APPOINTMENT (OUTPATIENT)
Dept: OPHTHALMOLOGY | Facility: CLINIC | Age: 77
End: 2022-08-05

## 2022-08-05 ENCOUNTER — NON-APPOINTMENT (OUTPATIENT)
Age: 77
End: 2022-08-05

## 2022-08-05 PROCEDURE — 66020 INJECTION TREATMENT OF EYE: CPT | Mod: 78,LT

## 2022-08-05 PROCEDURE — 99024 POSTOP FOLLOW-UP VISIT: CPT

## 2022-08-08 LAB — SURGICAL PATHOLOGY STUDY: SIGNIFICANT CHANGE UP

## 2022-08-09 ENCOUNTER — NON-APPOINTMENT (OUTPATIENT)
Age: 77
End: 2022-08-09

## 2022-08-09 ENCOUNTER — APPOINTMENT (OUTPATIENT)
Dept: OPHTHALMOLOGY | Facility: CLINIC | Age: 77
End: 2022-08-09

## 2022-08-09 PROCEDURE — 99024 POSTOP FOLLOW-UP VISIT: CPT

## 2022-08-09 PROCEDURE — 66020 INJECTION TREATMENT OF EYE: CPT | Mod: 78,LT

## 2022-08-11 ENCOUNTER — NON-APPOINTMENT (OUTPATIENT)
Age: 77
End: 2022-08-11

## 2022-08-11 ENCOUNTER — APPOINTMENT (OUTPATIENT)
Dept: OPHTHALMOLOGY | Facility: CLINIC | Age: 77
End: 2022-08-11

## 2022-08-11 ENCOUNTER — APPOINTMENT (OUTPATIENT)
Dept: OPHTHALMOLOGY | Facility: AMBULATORY SURGERY CENTER | Age: 77
End: 2022-08-11

## 2022-08-11 PROCEDURE — 92012 INTRM OPH EXAM EST PATIENT: CPT | Mod: 24,25

## 2022-08-11 PROCEDURE — 92071 CONTACT LENS FITTING FOR TX: CPT | Mod: LT

## 2022-08-11 PROCEDURE — 66020 INJECTION TREATMENT OF EYE: CPT | Mod: 78,LT

## 2022-08-15 ENCOUNTER — APPOINTMENT (OUTPATIENT)
Dept: OPHTHALMOLOGY | Facility: CLINIC | Age: 77
End: 2022-08-15

## 2022-08-15 ENCOUNTER — NON-APPOINTMENT (OUTPATIENT)
Age: 77
End: 2022-08-15

## 2022-08-15 PROCEDURE — 99024 POSTOP FOLLOW-UP VISIT: CPT

## 2022-08-15 PROCEDURE — 66020 INJECTION TREATMENT OF EYE: CPT | Mod: 58,LT

## 2022-08-15 PROCEDURE — 92071 CONTACT LENS FITTING FOR TX: CPT | Mod: LT

## 2022-08-17 ENCOUNTER — APPOINTMENT (OUTPATIENT)
Dept: OPHTHALMOLOGY | Facility: CLINIC | Age: 77
End: 2022-08-17

## 2022-08-17 ENCOUNTER — NON-APPOINTMENT (OUTPATIENT)
Age: 77
End: 2022-08-17

## 2022-08-17 PROCEDURE — 66020 INJECTION TREATMENT OF EYE: CPT | Mod: 58,LT

## 2022-08-17 PROCEDURE — 99024 POSTOP FOLLOW-UP VISIT: CPT

## 2022-08-19 ENCOUNTER — APPOINTMENT (OUTPATIENT)
Dept: OPHTHALMOLOGY | Facility: CLINIC | Age: 77
End: 2022-08-19

## 2022-08-19 ENCOUNTER — NON-APPOINTMENT (OUTPATIENT)
Age: 77
End: 2022-08-19

## 2022-08-19 PROCEDURE — 92071 CONTACT LENS FITTING FOR TX: CPT | Mod: LT

## 2022-08-19 PROCEDURE — 66020 INJECTION TREATMENT OF EYE: CPT | Mod: 58,LT

## 2022-08-19 PROCEDURE — 92012 INTRM OPH EXAM EST PATIENT: CPT | Mod: 24

## 2022-08-22 ENCOUNTER — APPOINTMENT (OUTPATIENT)
Dept: OPHTHALMOLOGY | Facility: CLINIC | Age: 77
End: 2022-08-22

## 2022-08-22 ENCOUNTER — NON-APPOINTMENT (OUTPATIENT)
Age: 77
End: 2022-08-22

## 2022-08-22 PROCEDURE — 66020 INJECTION TREATMENT OF EYE: CPT | Mod: 78,LT

## 2022-08-22 PROCEDURE — 92071 CONTACT LENS FITTING FOR TX: CPT | Mod: LT

## 2022-08-22 PROCEDURE — 92012 INTRM OPH EXAM EST PATIENT: CPT | Mod: 25

## 2022-08-24 ENCOUNTER — APPOINTMENT (OUTPATIENT)
Dept: OPHTHALMOLOGY | Facility: CLINIC | Age: 77
End: 2022-08-24

## 2022-08-24 ENCOUNTER — NON-APPOINTMENT (OUTPATIENT)
Age: 77
End: 2022-08-24

## 2022-08-24 PROCEDURE — 99024 POSTOP FOLLOW-UP VISIT: CPT

## 2022-08-24 PROCEDURE — 66020 INJECTION TREATMENT OF EYE: CPT | Mod: 58,LT

## 2022-08-26 ENCOUNTER — APPOINTMENT (OUTPATIENT)
Dept: OPHTHALMOLOGY | Facility: CLINIC | Age: 77
End: 2022-08-26

## 2022-08-26 ENCOUNTER — NON-APPOINTMENT (OUTPATIENT)
Age: 77
End: 2022-08-26

## 2022-08-26 PROCEDURE — 66020 INJECTION TREATMENT OF EYE: CPT | Mod: 78,LT

## 2022-08-26 PROCEDURE — 92012 INTRM OPH EXAM EST PATIENT: CPT | Mod: 24,25

## 2022-08-29 ENCOUNTER — APPOINTMENT (OUTPATIENT)
Dept: OPHTHALMOLOGY | Facility: CLINIC | Age: 77
End: 2022-08-29

## 2022-08-29 ENCOUNTER — NON-APPOINTMENT (OUTPATIENT)
Age: 77
End: 2022-08-29

## 2022-08-29 PROCEDURE — 92071 CONTACT LENS FITTING FOR TX: CPT | Mod: LT

## 2022-08-29 PROCEDURE — 99024 POSTOP FOLLOW-UP VISIT: CPT

## 2022-08-29 PROCEDURE — 66020 INJECTION TREATMENT OF EYE: CPT | Mod: 58,LT

## 2022-08-31 ENCOUNTER — NON-APPOINTMENT (OUTPATIENT)
Age: 77
End: 2022-08-31

## 2022-08-31 ENCOUNTER — APPOINTMENT (OUTPATIENT)
Dept: OPHTHALMOLOGY | Facility: CLINIC | Age: 77
End: 2022-08-31

## 2022-08-31 PROCEDURE — 99024 POSTOP FOLLOW-UP VISIT: CPT

## 2022-09-02 ENCOUNTER — APPOINTMENT (OUTPATIENT)
Dept: OPHTHALMOLOGY | Facility: CLINIC | Age: 77
End: 2022-09-02

## 2022-09-02 ENCOUNTER — NON-APPOINTMENT (OUTPATIENT)
Age: 77
End: 2022-09-02

## 2022-09-02 PROCEDURE — 66020 INJECTION TREATMENT OF EYE: CPT | Mod: 79,LT

## 2022-09-02 PROCEDURE — 92071 CONTACT LENS FITTING FOR TX: CPT | Mod: LT

## 2022-09-02 PROCEDURE — 92012 INTRM OPH EXAM EST PATIENT: CPT | Mod: 25,24

## 2022-09-04 ENCOUNTER — NON-APPOINTMENT (OUTPATIENT)
Age: 77
End: 2022-09-04

## 2022-09-06 ENCOUNTER — APPOINTMENT (OUTPATIENT)
Dept: OPHTHALMOLOGY | Facility: CLINIC | Age: 77
End: 2022-09-06

## 2022-09-06 ENCOUNTER — NON-APPOINTMENT (OUTPATIENT)
Age: 77
End: 2022-09-06

## 2022-09-06 PROCEDURE — 92012 INTRM OPH EXAM EST PATIENT: CPT | Mod: 25,24

## 2022-09-06 PROCEDURE — 92071 CONTACT LENS FITTING FOR TX: CPT | Mod: LT

## 2022-09-06 PROCEDURE — 66020 INJECTION TREATMENT OF EYE: CPT | Mod: LT,78

## 2022-09-07 ENCOUNTER — APPOINTMENT (OUTPATIENT)
Dept: OPHTHALMOLOGY | Facility: CLINIC | Age: 77
End: 2022-09-07

## 2022-09-07 ENCOUNTER — NON-APPOINTMENT (OUTPATIENT)
Age: 77
End: 2022-09-07

## 2022-09-07 PROCEDURE — 66020 INJECTION TREATMENT OF EYE: CPT | Mod: 78,LT

## 2022-09-07 PROCEDURE — 92012 INTRM OPH EXAM EST PATIENT: CPT | Mod: 24,25

## 2022-09-07 PROCEDURE — 92071 CONTACT LENS FITTING FOR TX: CPT | Mod: LT

## 2022-09-09 ENCOUNTER — NON-APPOINTMENT (OUTPATIENT)
Age: 77
End: 2022-09-09

## 2022-09-09 ENCOUNTER — APPOINTMENT (OUTPATIENT)
Dept: OPHTHALMOLOGY | Facility: CLINIC | Age: 77
End: 2022-09-09

## 2022-09-09 PROCEDURE — 92012 INTRM OPH EXAM EST PATIENT: CPT | Mod: 24,25

## 2022-09-09 PROCEDURE — 66020 INJECTION TREATMENT OF EYE: CPT | Mod: 78,LT

## 2022-09-09 PROCEDURE — 92071 CONTACT LENS FITTING FOR TX: CPT | Mod: LT

## 2022-09-12 ENCOUNTER — APPOINTMENT (OUTPATIENT)
Dept: OPHTHALMOLOGY | Facility: CLINIC | Age: 77
End: 2022-09-12

## 2022-09-12 ENCOUNTER — NON-APPOINTMENT (OUTPATIENT)
Age: 77
End: 2022-09-12

## 2022-09-12 PROCEDURE — 66020 INJECTION TREATMENT OF EYE: CPT | Mod: 78,LT

## 2022-09-12 PROCEDURE — 92012 INTRM OPH EXAM EST PATIENT: CPT | Mod: 24,25

## 2022-09-14 ENCOUNTER — APPOINTMENT (OUTPATIENT)
Dept: OPHTHALMOLOGY | Facility: CLINIC | Age: 77
End: 2022-09-14

## 2022-09-14 ENCOUNTER — NON-APPOINTMENT (OUTPATIENT)
Age: 77
End: 2022-09-14

## 2022-09-14 PROCEDURE — 11900 INJECT SKIN LESIONS </W 7: CPT

## 2022-09-16 ENCOUNTER — NON-APPOINTMENT (OUTPATIENT)
Age: 77
End: 2022-09-16

## 2022-09-16 ENCOUNTER — APPOINTMENT (OUTPATIENT)
Dept: OPHTHALMOLOGY | Facility: CLINIC | Age: 77
End: 2022-09-16

## 2022-09-16 PROCEDURE — 66020 INJECTION TREATMENT OF EYE: CPT | Mod: LT

## 2022-09-16 PROCEDURE — 92071 CONTACT LENS FITTING FOR TX: CPT | Mod: LT

## 2022-09-19 ENCOUNTER — APPOINTMENT (OUTPATIENT)
Dept: OPHTHALMOLOGY | Facility: CLINIC | Age: 77
End: 2022-09-19

## 2022-09-19 ENCOUNTER — NON-APPOINTMENT (OUTPATIENT)
Age: 77
End: 2022-09-19

## 2022-09-19 PROCEDURE — 66020 INJECTION TREATMENT OF EYE: CPT | Mod: 78,LT

## 2022-09-19 PROCEDURE — 92012 INTRM OPH EXAM EST PATIENT: CPT | Mod: 24,25

## 2022-09-21 ENCOUNTER — APPOINTMENT (OUTPATIENT)
Dept: OPHTHALMOLOGY | Facility: CLINIC | Age: 77
End: 2022-09-21

## 2022-09-21 ENCOUNTER — NON-APPOINTMENT (OUTPATIENT)
Age: 77
End: 2022-09-21

## 2022-09-21 PROCEDURE — 11900 INJECT SKIN LESIONS </W 7: CPT | Mod: 78,LT

## 2022-09-21 PROCEDURE — 92012 INTRM OPH EXAM EST PATIENT: CPT | Mod: 24,25

## 2022-09-23 ENCOUNTER — NON-APPOINTMENT (OUTPATIENT)
Age: 77
End: 2022-09-23

## 2022-09-23 ENCOUNTER — APPOINTMENT (OUTPATIENT)
Dept: OPHTHALMOLOGY | Facility: CLINIC | Age: 77
End: 2022-09-23

## 2022-09-23 PROCEDURE — 92012 INTRM OPH EXAM EST PATIENT: CPT | Mod: 24

## 2022-09-27 ENCOUNTER — NON-APPOINTMENT (OUTPATIENT)
Age: 77
End: 2022-09-27

## 2022-09-27 ENCOUNTER — APPOINTMENT (OUTPATIENT)
Dept: OPHTHALMOLOGY | Facility: CLINIC | Age: 77
End: 2022-09-27

## 2022-09-27 PROCEDURE — 92012 INTRM OPH EXAM EST PATIENT: CPT

## 2022-09-29 ENCOUNTER — APPOINTMENT (OUTPATIENT)
Dept: OPHTHALMOLOGY | Facility: CLINIC | Age: 77
End: 2022-09-29

## 2022-10-03 ENCOUNTER — APPOINTMENT (OUTPATIENT)
Dept: OPHTHALMOLOGY | Facility: CLINIC | Age: 77
End: 2022-10-03

## 2022-10-03 ENCOUNTER — NON-APPOINTMENT (OUTPATIENT)
Age: 77
End: 2022-10-03

## 2022-10-03 PROCEDURE — 92071 CONTACT LENS FITTING FOR TX: CPT | Mod: LT

## 2022-10-03 PROCEDURE — 66020 INJECTION TREATMENT OF EYE: CPT | Mod: LT

## 2022-10-05 ENCOUNTER — APPOINTMENT (OUTPATIENT)
Dept: OPHTHALMOLOGY | Facility: CLINIC | Age: 77
End: 2022-10-05

## 2022-10-05 ENCOUNTER — NON-APPOINTMENT (OUTPATIENT)
Age: 77
End: 2022-10-05

## 2022-10-05 PROCEDURE — 92071 CONTACT LENS FITTING FOR TX: CPT | Mod: LT

## 2022-10-05 PROCEDURE — 66020 INJECTION TREATMENT OF EYE: CPT | Mod: LT,78

## 2022-10-07 ENCOUNTER — APPOINTMENT (OUTPATIENT)
Dept: OPHTHALMOLOGY | Facility: CLINIC | Age: 77
End: 2022-10-07

## 2022-10-07 ENCOUNTER — NON-APPOINTMENT (OUTPATIENT)
Age: 77
End: 2022-10-07

## 2022-10-07 PROCEDURE — 66020 INJECTION TREATMENT OF EYE: CPT | Mod: 78,LT

## 2022-10-07 PROCEDURE — 92012 INTRM OPH EXAM EST PATIENT: CPT | Mod: 24,25

## 2022-10-10 ENCOUNTER — APPOINTMENT (OUTPATIENT)
Dept: OPHTHALMOLOGY | Facility: CLINIC | Age: 77
End: 2022-10-10

## 2022-10-10 ENCOUNTER — NON-APPOINTMENT (OUTPATIENT)
Age: 77
End: 2022-10-10

## 2022-10-10 PROCEDURE — 92012 INTRM OPH EXAM EST PATIENT: CPT | Mod: 24,25

## 2022-10-10 PROCEDURE — 92071 CONTACT LENS FITTING FOR TX: CPT | Mod: LT

## 2022-10-10 PROCEDURE — 66020 INJECTION TREATMENT OF EYE: CPT | Mod: 78,LT

## 2022-10-11 ENCOUNTER — APPOINTMENT (OUTPATIENT)
Dept: OPHTHALMOLOGY | Facility: CLINIC | Age: 77
End: 2022-10-11

## 2022-10-11 ENCOUNTER — NON-APPOINTMENT (OUTPATIENT)
Age: 77
End: 2022-10-11

## 2022-10-11 PROCEDURE — 92012 INTRM OPH EXAM EST PATIENT: CPT | Mod: 25

## 2022-10-11 PROCEDURE — 92071 CONTACT LENS FITTING FOR TX: CPT | Mod: LT

## 2022-10-12 ENCOUNTER — NON-APPOINTMENT (OUTPATIENT)
Age: 77
End: 2022-10-12

## 2022-10-12 ENCOUNTER — APPOINTMENT (OUTPATIENT)
Dept: OPHTHALMOLOGY | Facility: CLINIC | Age: 77
End: 2022-10-12

## 2022-10-12 PROCEDURE — 66020 INJECTION TREATMENT OF EYE: CPT | Mod: 78,LT

## 2022-10-12 PROCEDURE — 92012 INTRM OPH EXAM EST PATIENT: CPT | Mod: 24,25

## 2022-10-12 PROCEDURE — 92071 CONTACT LENS FITTING FOR TX: CPT | Mod: LT

## 2022-10-14 ENCOUNTER — APPOINTMENT (OUTPATIENT)
Dept: OPHTHALMOLOGY | Facility: CLINIC | Age: 77
End: 2022-10-14

## 2022-10-14 ENCOUNTER — NON-APPOINTMENT (OUTPATIENT)
Age: 77
End: 2022-10-14

## 2022-10-14 PROCEDURE — 92012 INTRM OPH EXAM EST PATIENT: CPT

## 2022-10-18 ENCOUNTER — APPOINTMENT (OUTPATIENT)
Dept: OPHTHALMOLOGY | Facility: CLINIC | Age: 77
End: 2022-10-18

## 2022-10-18 ENCOUNTER — NON-APPOINTMENT (OUTPATIENT)
Age: 77
End: 2022-10-18

## 2022-10-18 PROCEDURE — 92071 CONTACT LENS FITTING FOR TX: CPT | Mod: LT

## 2022-10-18 PROCEDURE — 92012 INTRM OPH EXAM EST PATIENT: CPT | Mod: 25

## 2022-10-18 PROCEDURE — 66020 INJECTION TREATMENT OF EYE: CPT | Mod: LT

## 2022-10-21 ENCOUNTER — NON-APPOINTMENT (OUTPATIENT)
Age: 77
End: 2022-10-21

## 2022-10-21 ENCOUNTER — APPOINTMENT (OUTPATIENT)
Dept: OPHTHALMOLOGY | Facility: CLINIC | Age: 77
End: 2022-10-21

## 2022-10-21 PROCEDURE — 99024 POSTOP FOLLOW-UP VISIT: CPT

## 2022-10-21 PROCEDURE — 66020 INJECTION TREATMENT OF EYE: CPT | Mod: 78,LT

## 2022-10-21 PROCEDURE — 92071 CONTACT LENS FITTING FOR TX: CPT | Mod: LT

## 2022-10-25 ENCOUNTER — NON-APPOINTMENT (OUTPATIENT)
Age: 77
End: 2022-10-25

## 2022-10-25 ENCOUNTER — APPOINTMENT (OUTPATIENT)
Dept: OPHTHALMOLOGY | Facility: CLINIC | Age: 77
End: 2022-10-25

## 2022-10-25 PROCEDURE — 92071 CONTACT LENS FITTING FOR TX: CPT | Mod: LT

## 2022-10-25 PROCEDURE — 66020 INJECTION TREATMENT OF EYE: CPT | Mod: 78,LT

## 2022-10-25 PROCEDURE — 99024 POSTOP FOLLOW-UP VISIT: CPT

## 2022-10-27 ENCOUNTER — NON-APPOINTMENT (OUTPATIENT)
Age: 77
End: 2022-10-27

## 2022-10-27 ENCOUNTER — APPOINTMENT (OUTPATIENT)
Dept: OPHTHALMOLOGY | Facility: CLINIC | Age: 77
End: 2022-10-27

## 2022-10-27 PROCEDURE — 92071 CONTACT LENS FITTING FOR TX: CPT | Mod: LT

## 2022-10-27 PROCEDURE — 76512 OPH US DX B-SCAN: CPT | Mod: LT

## 2022-10-27 PROCEDURE — 99213 OFFICE O/P EST LOW 20 MIN: CPT | Mod: 25

## 2022-10-27 PROCEDURE — 66020 INJECTION TREATMENT OF EYE: CPT | Mod: LT

## 2022-10-28 ENCOUNTER — APPOINTMENT (OUTPATIENT)
Dept: OPHTHALMOLOGY | Facility: CLINIC | Age: 77
End: 2022-10-28

## 2022-11-01 ENCOUNTER — NON-APPOINTMENT (OUTPATIENT)
Age: 77
End: 2022-11-01

## 2022-11-01 ENCOUNTER — APPOINTMENT (OUTPATIENT)
Dept: OPHTHALMOLOGY | Facility: CLINIC | Age: 77
End: 2022-11-01

## 2022-11-01 PROCEDURE — 66020 INJECTION TREATMENT OF EYE: CPT | Mod: LT

## 2022-11-01 PROCEDURE — 92071 CONTACT LENS FITTING FOR TX: CPT | Mod: LT

## 2022-11-01 PROCEDURE — 92012 INTRM OPH EXAM EST PATIENT: CPT | Mod: 25

## 2022-11-04 ENCOUNTER — APPOINTMENT (OUTPATIENT)
Dept: OPHTHALMOLOGY | Facility: CLINIC | Age: 77
End: 2022-11-04

## 2022-11-04 ENCOUNTER — NON-APPOINTMENT (OUTPATIENT)
Age: 77
End: 2022-11-04

## 2022-11-04 PROCEDURE — 66020 INJECTION TREATMENT OF EYE: CPT | Mod: 78,LT

## 2022-11-04 PROCEDURE — 92071 CONTACT LENS FITTING FOR TX: CPT | Mod: LT

## 2022-11-04 PROCEDURE — 92012 INTRM OPH EXAM EST PATIENT: CPT | Mod: 24,25

## 2022-11-08 ENCOUNTER — NON-APPOINTMENT (OUTPATIENT)
Age: 77
End: 2022-11-08

## 2022-11-08 ENCOUNTER — APPOINTMENT (OUTPATIENT)
Dept: OPHTHALMOLOGY | Facility: CLINIC | Age: 77
End: 2022-11-08

## 2022-11-08 PROCEDURE — 99024 POSTOP FOLLOW-UP VISIT: CPT

## 2022-11-08 PROCEDURE — 92285 EXTERNAL OCULAR PHOTOGRAPHY: CPT

## 2022-11-08 PROCEDURE — 66020 INJECTION TREATMENT OF EYE: CPT | Mod: 78,LT

## 2022-11-08 PROCEDURE — 92071 CONTACT LENS FITTING FOR TX: CPT | Mod: LT

## 2022-11-11 ENCOUNTER — NON-APPOINTMENT (OUTPATIENT)
Age: 77
End: 2022-11-11

## 2022-11-11 ENCOUNTER — APPOINTMENT (OUTPATIENT)
Dept: OPHTHALMOLOGY | Facility: CLINIC | Age: 77
End: 2022-11-11

## 2022-11-11 PROCEDURE — 92012 INTRM OPH EXAM EST PATIENT: CPT | Mod: 24

## 2022-11-15 ENCOUNTER — APPOINTMENT (OUTPATIENT)
Dept: OPHTHALMOLOGY | Facility: CLINIC | Age: 77
End: 2022-11-15

## 2022-11-15 ENCOUNTER — NON-APPOINTMENT (OUTPATIENT)
Age: 77
End: 2022-11-15

## 2022-11-15 PROCEDURE — 92012 INTRM OPH EXAM EST PATIENT: CPT

## 2022-11-18 ENCOUNTER — APPOINTMENT (OUTPATIENT)
Dept: OPHTHALMOLOGY | Facility: CLINIC | Age: 77
End: 2022-11-18

## 2022-11-18 ENCOUNTER — NON-APPOINTMENT (OUTPATIENT)
Age: 77
End: 2022-11-18

## 2022-11-18 PROCEDURE — 66020 INJECTION TREATMENT OF EYE: CPT | Mod: LT

## 2022-11-18 PROCEDURE — 92071 CONTACT LENS FITTING FOR TX: CPT | Mod: LT

## 2022-11-18 PROCEDURE — 92012 INTRM OPH EXAM EST PATIENT: CPT | Mod: 25

## 2022-11-22 ENCOUNTER — APPOINTMENT (OUTPATIENT)
Dept: OPHTHALMOLOGY | Facility: CLINIC | Age: 77
End: 2022-11-22

## 2022-11-22 ENCOUNTER — NON-APPOINTMENT (OUTPATIENT)
Age: 77
End: 2022-11-22

## 2022-11-22 PROCEDURE — 92071 CONTACT LENS FITTING FOR TX: CPT | Mod: LT

## 2022-11-22 PROCEDURE — 99024 POSTOP FOLLOW-UP VISIT: CPT

## 2022-11-22 PROCEDURE — 66020 INJECTION TREATMENT OF EYE: CPT | Mod: 78,LT

## 2022-11-23 ENCOUNTER — NON-APPOINTMENT (OUTPATIENT)
Age: 77
End: 2022-11-23

## 2022-11-23 ENCOUNTER — APPOINTMENT (OUTPATIENT)
Dept: OPHTHALMOLOGY | Facility: CLINIC | Age: 77
End: 2022-11-23

## 2022-11-23 PROCEDURE — 99213 OFFICE O/P EST LOW 20 MIN: CPT

## 2022-11-23 PROCEDURE — 92071 CONTACT LENS FITTING FOR TX: CPT | Mod: LT

## 2022-11-29 ENCOUNTER — APPOINTMENT (OUTPATIENT)
Dept: OPHTHALMOLOGY | Facility: CLINIC | Age: 77
End: 2022-11-29

## 2022-11-29 ENCOUNTER — NON-APPOINTMENT (OUTPATIENT)
Age: 77
End: 2022-11-29

## 2022-11-29 PROCEDURE — 66020 INJECTION TREATMENT OF EYE: CPT | Mod: LT

## 2022-11-29 PROCEDURE — 92012 INTRM OPH EXAM EST PATIENT: CPT | Mod: 25

## 2022-11-29 PROCEDURE — 92136 OPHTHALMIC BIOMETRY: CPT

## 2022-11-29 PROCEDURE — 92071 CONTACT LENS FITTING FOR TX: CPT | Mod: LT

## 2022-12-06 ENCOUNTER — APPOINTMENT (OUTPATIENT)
Dept: OPHTHALMOLOGY | Facility: CLINIC | Age: 77
End: 2022-12-06

## 2022-12-06 ENCOUNTER — NON-APPOINTMENT (OUTPATIENT)
Age: 77
End: 2022-12-06

## 2022-12-06 PROCEDURE — 92071 CONTACT LENS FITTING FOR TX: CPT | Mod: LT

## 2022-12-06 PROCEDURE — 66020 INJECTION TREATMENT OF EYE: CPT | Mod: 78,LT

## 2022-12-06 PROCEDURE — 99024 POSTOP FOLLOW-UP VISIT: CPT

## 2022-12-12 ENCOUNTER — APPOINTMENT (OUTPATIENT)
Dept: OPHTHALMOLOGY | Facility: CLINIC | Age: 77
End: 2022-12-12

## 2022-12-12 ENCOUNTER — NON-APPOINTMENT (OUTPATIENT)
Age: 77
End: 2022-12-12

## 2022-12-12 PROCEDURE — 92012 INTRM OPH EXAM EST PATIENT: CPT

## 2022-12-13 ENCOUNTER — NON-APPOINTMENT (OUTPATIENT)
Age: 77
End: 2022-12-13

## 2022-12-13 ENCOUNTER — APPOINTMENT (OUTPATIENT)
Dept: OPHTHALMOLOGY | Facility: CLINIC | Age: 77
End: 2022-12-13

## 2022-12-13 PROCEDURE — 92012 INTRM OPH EXAM EST PATIENT: CPT | Mod: 25

## 2022-12-13 PROCEDURE — 92071 CONTACT LENS FITTING FOR TX: CPT | Mod: LT

## 2022-12-20 ENCOUNTER — APPOINTMENT (OUTPATIENT)
Dept: OPHTHALMOLOGY | Facility: CLINIC | Age: 77
End: 2022-12-20
Payer: MEDICARE

## 2022-12-20 ENCOUNTER — NON-APPOINTMENT (OUTPATIENT)
Age: 77
End: 2022-12-20

## 2022-12-20 PROCEDURE — 92012 INTRM OPH EXAM EST PATIENT: CPT | Mod: 25

## 2022-12-20 PROCEDURE — 92071 CONTACT LENS FITTING FOR TX: CPT | Mod: LT

## 2022-12-20 PROCEDURE — 66020 INJECTION TREATMENT OF EYE: CPT | Mod: LT

## 2022-12-27 ENCOUNTER — NON-APPOINTMENT (OUTPATIENT)
Age: 77
End: 2022-12-27

## 2022-12-27 ENCOUNTER — APPOINTMENT (OUTPATIENT)
Dept: OPHTHALMOLOGY | Facility: CLINIC | Age: 77
End: 2022-12-27

## 2022-12-27 PROCEDURE — 92012 INTRM OPH EXAM EST PATIENT: CPT | Mod: 25

## 2022-12-27 PROCEDURE — 92071 CONTACT LENS FITTING FOR TX: CPT | Mod: LT

## 2022-12-27 PROCEDURE — 66020 INJECTION TREATMENT OF EYE: CPT | Mod: LT

## 2023-01-03 ENCOUNTER — NON-APPOINTMENT (OUTPATIENT)
Age: 78
End: 2023-01-03

## 2023-01-03 ENCOUNTER — APPOINTMENT (OUTPATIENT)
Dept: OPHTHALMOLOGY | Facility: CLINIC | Age: 78
End: 2023-01-03
Payer: MEDICARE

## 2023-01-03 PROCEDURE — 92071 CONTACT LENS FITTING FOR TX: CPT | Mod: LT

## 2023-01-03 PROCEDURE — 66020 INJECTION TREATMENT OF EYE: CPT | Mod: LT

## 2023-01-03 PROCEDURE — 92012 INTRM OPH EXAM EST PATIENT: CPT | Mod: 25

## 2023-01-10 ENCOUNTER — NON-APPOINTMENT (OUTPATIENT)
Age: 78
End: 2023-01-10

## 2023-01-10 ENCOUNTER — APPOINTMENT (OUTPATIENT)
Dept: OPHTHALMOLOGY | Facility: CLINIC | Age: 78
End: 2023-01-10
Payer: MEDICARE

## 2023-01-10 PROCEDURE — 66020 INJECTION TREATMENT OF EYE: CPT | Mod: LT

## 2023-01-10 PROCEDURE — 92071 CONTACT LENS FITTING FOR TX: CPT | Mod: LT

## 2023-01-10 PROCEDURE — 92012 INTRM OPH EXAM EST PATIENT: CPT | Mod: 25

## 2023-01-17 ENCOUNTER — APPOINTMENT (OUTPATIENT)
Dept: OPHTHALMOLOGY | Facility: CLINIC | Age: 78
End: 2023-01-17
Payer: MEDICARE

## 2023-01-17 ENCOUNTER — NON-APPOINTMENT (OUTPATIENT)
Age: 78
End: 2023-01-17

## 2023-01-17 PROCEDURE — 92012 INTRM OPH EXAM EST PATIENT: CPT | Mod: 24,25

## 2023-01-17 PROCEDURE — 92071 CONTACT LENS FITTING FOR TX: CPT | Mod: LT

## 2023-01-17 PROCEDURE — 66020 INJECTION TREATMENT OF EYE: CPT | Mod: 78,LT

## 2023-01-24 ENCOUNTER — APPOINTMENT (OUTPATIENT)
Dept: OPHTHALMOLOGY | Facility: CLINIC | Age: 78
End: 2023-01-24
Payer: MEDICARE

## 2023-01-24 ENCOUNTER — NON-APPOINTMENT (OUTPATIENT)
Age: 78
End: 2023-01-24

## 2023-01-24 PROCEDURE — 92071 CONTACT LENS FITTING FOR TX: CPT | Mod: LT

## 2023-01-24 PROCEDURE — 92012 INTRM OPH EXAM EST PATIENT: CPT | Mod: 25

## 2023-01-24 PROCEDURE — 66020 INJECTION TREATMENT OF EYE: CPT | Mod: LT

## 2023-01-27 NOTE — ASU PATIENT PROFILE, ADULT - NS PREOP UNDERSTANDS INFO
No solid food after 12 mid-night 1/29/2023/ water and clear apple juice no later than 9am DOS/ photo ID and insurance card/ comfortable clothing/ ms Sam will take her home/yes No solid food or milk products after 12 mid-night 1/29/2023/ water and clear apple juice no later than 9am DOS/ photo ID and insurance card/ comfortable clothing/ ms Sam will take her home/yes

## 2023-01-27 NOTE — ASU PATIENT PROFILE, ADULT - FALL HARM RISK - UNIVERSAL INTERVENTIONS
Vaccine status unknown
Bed in lowest position, wheels locked, appropriate side rails in place/Call bell, personal items and telephone in reach/Instruct patient to call for assistance before getting out of bed or chair/Non-slip footwear when patient is out of bed/[place] to call system/Physically safe environment - no spills, clutter or unnecessary equipment/Purposeful Proactive Rounding/Room/bathroom lighting operational, light cord in reach

## 2023-01-27 NOTE — ASU PATIENT PROFILE, ADULT - NSICDXPASTSURGICALHX_GEN_ALL_CORE_FT
PAST SURGICAL HISTORY:  Closed fracture of sesamoid bone of left hand, initial encounter     Corneal transplant failure, left eye     History of repair of hip joint     Lung mass

## 2023-01-27 NOTE — ASU PATIENT PROFILE, ADULT - NSICDXPASTMEDICALHX_GEN_ALL_CORE_FT
PAST MEDICAL HISTORY:  Emphysema lung     Lung cancer     Lupus erythematosus      PAST MEDICAL HISTORY:  Chronic obstructive pulmonary disease (COPD)     Emphysema lung     Lung cancer     Lupus erythematosus

## 2023-01-30 ENCOUNTER — OUTPATIENT (OUTPATIENT)
Dept: OUTPATIENT SERVICES | Facility: HOSPITAL | Age: 78
LOS: 1 days | Discharge: HOME | End: 2023-01-30
Payer: MEDICARE

## 2023-01-30 ENCOUNTER — TRANSCRIPTION ENCOUNTER (OUTPATIENT)
Age: 78
End: 2023-01-30

## 2023-01-30 ENCOUNTER — APPOINTMENT (OUTPATIENT)
Dept: OPHTHALMOLOGY | Facility: AMBULATORY SURGERY CENTER | Age: 78
End: 2023-01-30

## 2023-01-30 ENCOUNTER — NON-APPOINTMENT (OUTPATIENT)
Age: 78
End: 2023-01-30

## 2023-01-30 VITALS
DIASTOLIC BLOOD PRESSURE: 53 MMHG | SYSTOLIC BLOOD PRESSURE: 99 MMHG | OXYGEN SATURATION: 99 % | RESPIRATION RATE: 16 BRPM | HEART RATE: 60 BPM

## 2023-01-30 VITALS
HEIGHT: 64 IN | OXYGEN SATURATION: 98 % | WEIGHT: 88.41 LBS | TEMPERATURE: 98 F | SYSTOLIC BLOOD PRESSURE: 114 MMHG | DIASTOLIC BLOOD PRESSURE: 62 MMHG | HEART RATE: 64 BPM | RESPIRATION RATE: 16 BRPM

## 2023-01-30 DIAGNOSIS — Z98.890 OTHER SPECIFIED POSTPROCEDURAL STATES: Chronic | ICD-10-CM

## 2023-01-30 DIAGNOSIS — R91.8 OTHER NONSPECIFIC ABNORMAL FINDING OF LUNG FIELD: Chronic | ICD-10-CM

## 2023-01-30 DIAGNOSIS — T86.8412 CORNEAL TRANSPLANT FAILURE, LEFT EYE: Chronic | ICD-10-CM

## 2023-01-30 DIAGNOSIS — S62.102A FRACTURE OF UNSPECIFIED CARPAL BONE, LEFT WRIST, INITIAL ENCOUNTER FOR CLOSED FRACTURE: Chronic | ICD-10-CM

## 2023-01-30 PROCEDURE — 66985 INSERT LENS PROSTHESIS: CPT | Mod: 78,LT

## 2023-01-30 PROCEDURE — 88304 TISSUE EXAM BY PATHOLOGIST: CPT | Mod: 26

## 2023-01-30 PROCEDURE — 65750 CORNEAL TRANSPLANT: CPT | Mod: 78,LT

## 2023-01-30 DEVICE — IMPLANTABLE DEVICE: Type: IMPLANTABLE DEVICE | Site: LEFT (LEFT EYE) | Status: FUNCTIONAL

## 2023-01-30 RX ORDER — TROPICAMIDE 1 %
1 DROPS OPHTHALMIC (EYE)
Refills: 0 | Status: COMPLETED | OUTPATIENT
Start: 2023-01-30 | End: 2023-01-30

## 2023-01-30 RX ORDER — ACETAMINOPHEN 500 MG
650 TABLET ORAL ONCE
Refills: 0 | Status: DISCONTINUED | OUTPATIENT
Start: 2023-01-30 | End: 2023-01-30

## 2023-01-30 RX ORDER — ONDANSETRON 8 MG/1
4 TABLET, FILM COATED ORAL ONCE
Refills: 0 | Status: DISCONTINUED | OUTPATIENT
Start: 2023-01-30 | End: 2023-01-30

## 2023-01-30 RX ORDER — PHENYLEPHRINE HCL 2.5 %
1 DROPS OPHTHALMIC (EYE)
Refills: 0 | Status: COMPLETED | OUTPATIENT
Start: 2023-01-30 | End: 2023-01-30

## 2023-01-30 RX ORDER — OFLOXACIN 0.3 %
1 DROPS OPHTHALMIC (EYE)
Refills: 0 | Status: COMPLETED | OUTPATIENT
Start: 2023-01-30 | End: 2023-01-30

## 2023-01-30 RX ORDER — SODIUM CHLORIDE 9 MG/ML
1000 INJECTION, SOLUTION INTRAVENOUS
Refills: 0 | Status: DISCONTINUED | OUTPATIENT
Start: 2023-01-30 | End: 2023-01-30

## 2023-01-30 RX ADMIN — Medication 1 DROP(S): at 12:12

## 2023-01-30 RX ADMIN — Medication 1 DROP(S): at 11:57

## 2023-01-30 RX ADMIN — Medication 1 DROP(S): at 11:56

## 2023-01-30 RX ADMIN — Medication 1 DROP(S): at 12:05

## 2023-01-30 RX ADMIN — Medication 1 DROP(S): at 12:06

## 2023-01-30 NOTE — OPERATIVE REPORT - OPERATIVE RPOSRT DETAILS
PREOPERATIVE DIAGNOSIS:  Aphakia, Infectious Fungal Keratitis Left eye    POSTOPERATIVE DIAGNOSIS:  Same Left eye    OPERATIVE PROCEDURE:  Penetrating Keratoplasty, Yamane Intraocular fixation Left eye    IMPLANT: Donor #                                  Graft size:                 Host trephination size:  Lens: CTLucia 16D      PROCEDURE:	  The patient was brought into the operating room and placed supine on the operating room table. After uneventful induction of neuroleptic anesthesia, a retrobulbar block consisting of 5-7  cc of 2% lidocaine and 0.75% marcaine was administered around the left eye.  A modified van Lint style lid block was also given.  The patient was prepped and draped in the usual sterile fashion. A wire lid speculum was inserted into the operative eye giving a wide palpebral fissure.  This procedure is going to be performed with an assistant surgeon whose extra hands are required to properly manage the surgery.   Sitting temporally, a toric IOL marker was used to place marks at the superior and inferior meridians to guide the entry sites of the 30 gauge needles to help align the IOL haptics. A caliper is used to make two marks at each meridian. The first markings are placed two mm posterior to the limbus at the site of toric markers at the limbus. The second marks are 2mm from this posterior rubens. In a left eye the inferior marking is placed 2mm temporal and the superior marking 2mm nasally.  Angled sclerotomy are made through the conjunctiva using two 30-gauge thin walled needles, starting at the second rubens and entering the eye 2mm posterior to the original marks made with the toric IOL marker.     Using Castroviejo calipers the graft size was determined that would adequately excise pathologic corneal tissue avoiding limbal blood vessels. A Fleuringa ring was centered around the corneoscleral limbus and secured to superficial sclera with four interrupted 8-0 vicryl sutures. Attention was then addressed to the donor cornea which was inspected and found to be suitable for use. The donor was placed endothelial size up on a suction punch block. A new disposable trephine was then used to fashion a donor corneal button of appropriate size. This was covered with corneal storage media and set aside for later use.   Attention was then readdressed to the recipient. A new suction trephine was then centered over the cornea. Suction was attained and cut down continued until the anterior chamber was entered. The corneal button was then completely excised using a karely knife. THE IOL lead haptic was threaded into the lumen of appropriate needle. The needle/haptic complex is then externalized. The tip of the haptic is then cauterized with a high temp handheld cautery so that a small nub approximately 0.3mm is formed to prevent the haptic from slipping back in the eye.  The second haptic is then placed into the eye and threaded into the lumen of the other 30 gauge needle. The second needle/haptic complex is then externalized through the conjunctiva and the haptic tip is cauterized in the same manner.    Viscoelastic was then used to coat the lens iris diaphragm. The previously fashioned corneal donor button was then placed endothelial side down in the recipient bed. It was secured into position with 16 interrupted 10-0 nylon sutures. These sutures were knotted securely and the knots buried. The anterior chamber was maintained throughout the procedure using balanced salt solution. The Fleuringa ring was then removed from the eye. The haptics were then carefully threaded back into the tunnels and covered by conjunctiva.   Separate conjunctival irrigations consisting of 4 mg dexamethasone and 100 mg cefazolin were administered inferiorly. The lid speculum was removed from the eye and a shield and dressing placed over the eye.  The patient tolerated the procedure well and went to the recovery area in good condition.       PREOPERATIVE DIAGNOSIS:  Aphakia, Infectious Fungal Keratitis Left eye    POSTOPERATIVE DIAGNOSIS:  Same Left eye    OPERATIVE PROCEDURE:  Penetrating Keratoplasty, Yamane Intraocular fixation Left eye    IMPLANT: Donor #   [id number]                               Graft size:      8.75           Host trephination size: 8.5mm    Lens: CTLucia 16D      PROCEDURE:	  The patient was brought into the operating room and placed supine on the operating room table. After uneventful induction of neuroleptic anesthesia, a retrobulbar block consisting of 5-7  cc of 2% lidocaine and 0.75% marcaine was administered around the left eye.  A modified van Lint style lid block was also given.  The patient was prepped and draped in the usual sterile fashion. A wire lid speculum was inserted into the operative eye giving a wide palpebral fissure.  This procedure is going to be performed with an assistant surgeon whose extra hands are required to properly manage the surgery.   Sitting temporally, a toric IOL marker was used to place marks at the superior and inferior meridians to guide the entry sites of the 30 gauge needles to help align the IOL haptics. A caliper is used to make two marks at each meridian. The first markings are placed two mm posterior to the limbus at the site of toric markers at the limbus. The second marks are 2mm from this posterior rubens. In a left eye the inferior marking is placed 2mm temporal and the superior marking 2mm nasally.  Angled sclerotomy are made through the conjunctiva using two 30-gauge thin walled needles, starting at the second rubens and entering the eye 2mm posterior to the original marks made with the toric IOL marker.     Using Castroviejo calipers the graft size was determined that would adequately excise pathologic corneal tissue avoiding limbal blood vessels. Attention was then addressed to the donor cornea which was inspected and found to be suitable for use. The donor was placed endothelial size up on a suction punch block. A new disposable trephine was then used to fashion a donor corneal button of appropriate size. This was covered with corneal storage media and set aside for later use.   Attention was then readdressed to the recipient. A new suction trephine was then centered over the cornea. Suction was attained and cut down continued until the anterior chamber was entered. The corneal button was then completely excised using a karely knife. THE IOL lead haptic was threaded into the lumen of appropriate needle. The needle/haptic complex is then externalized. The tip of the haptic is then cauterized with a high temp handheld cautery so that a small nub approximately 0.3mm is formed to prevent the haptic from slipping back in the eye.  The second haptic is then placed into the eye and threaded into the lumen of the other 30 gauge needle. The second needle/haptic complex is then externalized through the conjunctiva and the haptic tip is cauterized in the same manner.    Viscoelastic was then used to coat the lens iris diaphragm. The previously fashioned corneal donor button was then placed endothelial side down in the recipient bed. It was secured into position with 16 interrupted 10-0 nylon sutures. These sutures were knotted securely and the knots buried. The anterior chamber was maintained throughout the procedure using balanced salt solution. The haptics were then carefully threaded back into the tunnels and covered by conjunctiva.   Separate conjunctival irrigations consisting of 4 mg dexamethasone and 100 mg cefazolin were administered inferiorly. The lid speculum was removed from the eye and a shield and dressing placed over the eye.  The patient tolerated the procedure well and went to the recovery area in good condition.

## 2023-01-30 NOTE — ASU DISCHARGE PLAN (ADULT/PEDIATRIC) - NS MD DC FALL RISK RISK
For information on Fall & Injury Prevention, visit: [email or web address] OR  [email or web address] OR  https://www.cdc.gov/steadi/patient.html

## 2023-01-30 NOTE — ASU PREOP CHECKLIST - 2.
Patient with soft lens on the left eye to protect the eye, Dr Juárez made aware and eye drops given as per ordered by Dr Juárez Patient with soft lens on the left eye for  protection , Dr Juárez made aware and eye drops given as per ordered by Dr Juárez

## 2023-01-30 NOTE — PRE-ANESTHESIA EVALUATION ADULT - NSANTHOSAYNRD_GEN_A_CORE
No. CAT screening performed.  STOP BANG Legend: 0-2 = LOW Risk; 3-4 = INTERMEDIATE Risk; 5-8 = HIGH Risk

## 2023-01-31 ENCOUNTER — APPOINTMENT (OUTPATIENT)
Dept: OPHTHALMOLOGY | Facility: CLINIC | Age: 78
End: 2023-01-31
Payer: MEDICARE

## 2023-01-31 ENCOUNTER — NON-APPOINTMENT (OUTPATIENT)
Age: 78
End: 2023-01-31

## 2023-01-31 LAB
GRAM STN FLD: SIGNIFICANT CHANGE UP
SPECIMEN SOURCE: SIGNIFICANT CHANGE UP

## 2023-01-31 PROCEDURE — 99024 POSTOP FOLLOW-UP VISIT: CPT

## 2023-02-07 ENCOUNTER — APPOINTMENT (OUTPATIENT)
Dept: OPHTHALMOLOGY | Facility: CLINIC | Age: 78
End: 2023-02-07
Payer: MEDICARE

## 2023-02-07 ENCOUNTER — NON-APPOINTMENT (OUTPATIENT)
Age: 78
End: 2023-02-07

## 2023-02-07 PROBLEM — J44.9 CHRONIC OBSTRUCTIVE PULMONARY DISEASE, UNSPECIFIED: Chronic | Status: ACTIVE | Noted: 2023-01-30

## 2023-02-07 PROCEDURE — 92012 INTRM OPH EXAM EST PATIENT: CPT

## 2023-02-07 PROCEDURE — 76512 OPH US DX B-SCAN: CPT | Mod: LT

## 2023-02-08 LAB — SURGICAL PATHOLOGY STUDY: SIGNIFICANT CHANGE UP

## 2023-02-14 ENCOUNTER — NON-APPOINTMENT (OUTPATIENT)
Age: 78
End: 2023-02-14

## 2023-02-14 ENCOUNTER — APPOINTMENT (OUTPATIENT)
Dept: OPHTHALMOLOGY | Facility: CLINIC | Age: 78
End: 2023-02-14
Payer: MEDICARE

## 2023-02-14 PROCEDURE — 76512 OPH US DX B-SCAN: CPT | Mod: LT

## 2023-02-14 PROCEDURE — 99212 OFFICE O/P EST SF 10 MIN: CPT

## 2023-02-21 ENCOUNTER — NON-APPOINTMENT (OUTPATIENT)
Age: 78
End: 2023-02-21

## 2023-02-21 ENCOUNTER — APPOINTMENT (OUTPATIENT)
Dept: OPHTHALMOLOGY | Facility: CLINIC | Age: 78
End: 2023-02-21
Payer: MEDICARE

## 2023-02-21 LAB
CULTURE RESULTS: NO GROWTH — SIGNIFICANT CHANGE UP
SPECIMEN SOURCE: SIGNIFICANT CHANGE UP

## 2023-02-21 PROCEDURE — 92012 INTRM OPH EXAM EST PATIENT: CPT

## 2023-02-28 ENCOUNTER — APPOINTMENT (OUTPATIENT)
Dept: OPHTHALMOLOGY | Facility: CLINIC | Age: 78
End: 2023-02-28
Payer: MEDICARE

## 2023-02-28 ENCOUNTER — NON-APPOINTMENT (OUTPATIENT)
Age: 78
End: 2023-02-28

## 2023-02-28 PROCEDURE — 99212 OFFICE O/P EST SF 10 MIN: CPT

## 2023-02-28 PROCEDURE — 76512 OPH US DX B-SCAN: CPT | Mod: LT

## 2023-03-07 ENCOUNTER — NON-APPOINTMENT (OUTPATIENT)
Age: 78
End: 2023-03-07

## 2023-03-07 ENCOUNTER — APPOINTMENT (OUTPATIENT)
Dept: OPHTHALMOLOGY | Facility: CLINIC | Age: 78
End: 2023-03-07
Payer: MEDICARE

## 2023-03-07 PROCEDURE — 76512 OPH US DX B-SCAN: CPT | Mod: LT

## 2023-03-07 PROCEDURE — 92012 INTRM OPH EXAM EST PATIENT: CPT

## 2023-03-14 ENCOUNTER — APPOINTMENT (OUTPATIENT)
Dept: OPHTHALMOLOGY | Facility: CLINIC | Age: 78
End: 2023-03-14
Payer: MEDICARE

## 2023-03-14 ENCOUNTER — NON-APPOINTMENT (OUTPATIENT)
Age: 78
End: 2023-03-14

## 2023-03-14 PROCEDURE — 99212 OFFICE O/P EST SF 10 MIN: CPT

## 2023-03-14 PROCEDURE — 76512 OPH US DX B-SCAN: CPT | Mod: LT

## 2023-03-21 ENCOUNTER — APPOINTMENT (OUTPATIENT)
Dept: OPHTHALMOLOGY | Facility: CLINIC | Age: 78
End: 2023-03-21
Payer: MEDICARE

## 2023-03-21 ENCOUNTER — NON-APPOINTMENT (OUTPATIENT)
Age: 78
End: 2023-03-21

## 2023-03-21 PROCEDURE — 99212 OFFICE O/P EST SF 10 MIN: CPT

## 2023-03-21 RX ORDER — PHENYLEPHRINE HCL 2.5 %
1 DROPS OPHTHALMIC (EYE)
Refills: 0 | Status: DISCONTINUED | OUTPATIENT
Start: 2023-03-27 | End: 2023-03-27

## 2023-03-21 RX ORDER — SODIUM CHLORIDE 9 MG/ML
1000 INJECTION, SOLUTION INTRAVENOUS
Refills: 0 | Status: DISCONTINUED | OUTPATIENT
Start: 2023-03-27 | End: 2023-03-27

## 2023-03-21 RX ORDER — TROPICAMIDE 1 %
1 DROPS OPHTHALMIC (EYE)
Refills: 0 | Status: DISCONTINUED | OUTPATIENT
Start: 2023-03-27 | End: 2023-03-27

## 2023-03-21 RX ORDER — OFLOXACIN 0.3 %
1 DROPS OPHTHALMIC (EYE)
Refills: 0 | Status: DISCONTINUED | OUTPATIENT
Start: 2023-03-27 | End: 2023-03-27

## 2023-03-21 RX ORDER — ATROPINE SULFATE 1 %
1 DROPS OPHTHALMIC (EYE)
Refills: 0 | Status: DISCONTINUED | OUTPATIENT
Start: 2023-03-27 | End: 2023-03-27

## 2023-03-24 NOTE — ASU PATIENT PROFILE, ADULT - NSICDXPASTMEDICALHX_GEN_ALL_CORE_FT
PAST MEDICAL HISTORY:  Chronic obstructive pulmonary disease (COPD)     Emphysema lung     Lung cancer     Lupus erythematosus

## 2023-03-24 NOTE — ASU PATIENT PROFILE, ADULT - NS PREOP UNDERSTANDS INFO
No solid food or milk products after 12 mid-night 3/26/2023/ water or clear apple juice no later than 8am DOS/ photo ID and insurance card/ comfortable clothing/ escort to take you home/yes

## 2023-03-27 ENCOUNTER — NON-APPOINTMENT (OUTPATIENT)
Age: 78
End: 2023-03-27

## 2023-03-27 ENCOUNTER — TRANSCRIPTION ENCOUNTER (OUTPATIENT)
Age: 78
End: 2023-03-27

## 2023-03-27 ENCOUNTER — APPOINTMENT (OUTPATIENT)
Dept: OPHTHALMOLOGY | Facility: AMBULATORY SURGERY CENTER | Age: 78
End: 2023-03-27

## 2023-03-27 ENCOUNTER — OUTPATIENT (OUTPATIENT)
Dept: OUTPATIENT SERVICES | Facility: HOSPITAL | Age: 78
LOS: 1 days | Discharge: HOME | End: 2023-03-27
Payer: MEDICARE

## 2023-03-27 VITALS
WEIGHT: 85.98 LBS | SYSTOLIC BLOOD PRESSURE: 106 MMHG | OXYGEN SATURATION: 98 % | DIASTOLIC BLOOD PRESSURE: 52 MMHG | HEART RATE: 60 BPM | HEIGHT: 64 IN | TEMPERATURE: 98 F

## 2023-03-27 VITALS
DIASTOLIC BLOOD PRESSURE: 69 MMHG | RESPIRATION RATE: 16 BRPM | TEMPERATURE: 99 F | HEART RATE: 64 BPM | SYSTOLIC BLOOD PRESSURE: 106 MMHG | OXYGEN SATURATION: 97 %

## 2023-03-27 DIAGNOSIS — S62.102A FRACTURE OF UNSPECIFIED CARPAL BONE, LEFT WRIST, INITIAL ENCOUNTER FOR CLOSED FRACTURE: Chronic | ICD-10-CM

## 2023-03-27 DIAGNOSIS — R91.8 OTHER NONSPECIFIC ABNORMAL FINDING OF LUNG FIELD: Chronic | ICD-10-CM

## 2023-03-27 DIAGNOSIS — Z98.890 OTHER SPECIFIED POSTPROCEDURAL STATES: Chronic | ICD-10-CM

## 2023-03-27 DIAGNOSIS — T86.8412 CORNEAL TRANSPLANT FAILURE, LEFT EYE: Chronic | ICD-10-CM

## 2023-03-27 PROCEDURE — 67039 LASER TREATMENT OF RETINA: CPT

## 2023-03-27 DEVICE — LASER PROBE 25G CONSTELLATION: Type: IMPLANTABLE DEVICE | Site: LEFT | Status: FUNCTIONAL

## 2023-03-27 RX ORDER — ACETAMINOPHEN 500 MG
650 TABLET ORAL ONCE
Refills: 0 | Status: DISCONTINUED | OUTPATIENT
Start: 2023-03-27 | End: 2023-03-27

## 2023-03-27 NOTE — ASU DISCHARGE PLAN (ADULT/PEDIATRIC) - NS MD DC FALL RISK RISK
For information on Fall & Injury Prevention, visit: [email or web address] OR  [email or web address] OR  https://www.cdc.gov/steadi/patient.html

## 2023-03-27 NOTE — OPERATIVE REPORT - OPERATIVE RPOSRT DETAILS
Vitreous Hemorrhage     PATIENT NAME: KEI HANSEN    ATTENDING: Erika Chand MD    PREOPERATIVE DIAGNOSIS(ES):  Vitreous Hemorrhage, h/o endophthalmitis  - left eye       PROCEDURE: Pars plana vitrectomy, Endolaser,  - all of the left eye     INDICATIONS:       The patient is a 77y year old Female with a history of endophthalmitis and a vitreous hemorrhage in the operative eye. After a detailed review of the risks, benefits and alternatives of the procedure, including but not limited to bleeding, infection, inflammation, retinal detachment, loss of vision, loss of eye, double vision, unclear visual potential, need for further surgery, and systemic risks of anesthesia and surgery, informed consent was obtained and the patient elected to proceed with the surgery.      PROCEDURE:       On the day of surgery, after clearance by medicine and anesthesia, the patient was brought to the operating room in stable condition. After verifying the correct surgical site, the patient was placed in the supine position. Intravenous sedation was provided by the anesthesia team.  After a brief time-out, a 1:1 mixture of 2% lidocaine and 0.75% Marcaine was injected in a retrobulbar fashion behind the operative eye. The patient was then prepped and draped in the usual sterile fashion.  Eyelashes were draped out of the operative field and a stainless steel eyelid speculum was placed in the operative eye.  A time-out was performed verifying correct patient, procedure, site, positioning and special equipment prior to starting the case.      A 25-gauge microcannula was placed in the inferotemporal quadrant in a beveled fashion 3.5 mm posterior to the limbus. The infusion cannula was cleared of air, inserted into the microcannula, confirmed to be in the correct position within the vitreous cavity by direct visualization through the dilated pupil with the light pipe and then turned on under direct visualization. Two additional microcannulas were placed superonasally and superotemporally in a similar fashion. The vitrectomy hand piece and light were then inserted into the eye and the anterior vitreous was cleared under direct visualization.       Then, under indirect wide field visualization, a core and peripheral vitrectomy was performed. The vitreous and associated hemorrhage was excised 360 degrees to the posterior vitreous base. There was an area inferotemporally which had been noted on Bscan pre-operatively (including the day of surgery), this was barricaded with endolaser. No visible tears were identified but there was some overlying hemorrhage. As much of this was removed using the backflush as was safely possible. 360 degree scleral indentation was performed and no additional holes, tears, or detachments were identified.       The retina remained flat.  The microcannulas were removed from the eye. All sclerotomies were sutured with 8-0 Vicryl and remained airtight and the eye remained at a physiologic pressure by palpation. The IOL remained well centered.    The eyelid speculum was removed from the eye. Betadine was cleaned from around the eye. A topical steroid/antibiotic cream was placed on the eye, followed by a patch and a clear plastic shield. The patient tolerated the procedure well and left the operating room in stable condition.

## 2023-03-28 ENCOUNTER — APPOINTMENT (OUTPATIENT)
Dept: OPHTHALMOLOGY | Facility: CLINIC | Age: 78
End: 2023-03-28
Payer: MEDICARE

## 2023-03-28 ENCOUNTER — NON-APPOINTMENT (OUTPATIENT)
Age: 78
End: 2023-03-28

## 2023-03-28 PROCEDURE — 76512 OPH US DX B-SCAN: CPT | Mod: LT

## 2023-03-28 PROCEDURE — 99024 POSTOP FOLLOW-UP VISIT: CPT

## 2023-03-30 ENCOUNTER — TRANSCRIPTION ENCOUNTER (OUTPATIENT)
Age: 78
End: 2023-03-30

## 2023-03-30 RX ORDER — SODIUM CHLORIDE 9 MG/ML
1000 INJECTION, SOLUTION INTRAVENOUS
Refills: 0 | Status: DISCONTINUED | OUTPATIENT
Start: 2023-03-31 | End: 2023-03-31

## 2023-03-30 NOTE — ASU PATIENT PROFILE, ADULT - IS PATIENT PREGNANT?
" Anesthesia Evaluation     Patient summary reviewed and Nursing notes reviewed   history of anesthetic complications: prolonged sedation  NPO Solid Status: > 8 hours  NPO Liquid Status: > 2 hours           Airway   Mallampati: II  TM distance: >3 FB  Neck ROM: full  No difficulty expected  Dental      Pulmonary - normal exam   (+) asthma,  Cardiovascular - normal exam    ECG reviewed  PT is on anticoagulation therapy    (+) DVT (IVC filter) current, hyperlipidemia,       Neuro/Psych  (+) headaches,    GI/Hepatic/Renal/Endo    (+) obesity,   renal disease stones, thyroid problem hypothyroidism    Musculoskeletal     Abdominal   (+) obese,    Substance History      OB/GYN          Other        ROS/Med Hx Other: N/v on the floor today. NPO x 12+ hours for solids and liquids                Anesthesia Plan    ASA 2     general     (I have reviewed the patient's history and chart with the patient, including all pertinent laboratory results and imaging. I have explained the risks of anesthesia including but not limited to dental damage, corneal abrasion, nerve injury, MI, stroke, aspiration, and death. Patient has agreed to proceed.     /99 (BP Location: Left arm, Patient Position: Lying)   Pulse 91   Temp 36.7 °C (98.1 °F) (Oral)   Resp 16   Ht 160 cm (63\")   Wt 81.4 kg (179 lb 7.3 oz)   SpO2 100%   BMI 31.79 kg/m²   )  intravenous induction     Anesthetic plan, risks, benefits, and alternatives have been provided, discussed and informed consent has been obtained with: patient.        CODE STATUS:       " no

## 2023-03-30 NOTE — ASU PATIENT PROFILE, ADULT - BLOOD TRANSFUSION, PREVIOUS, PROFILE
[facility] -- Goran Kaminski, Jason, Carlos, Elliott Thomas Savella  Office # [phone]      Follow Up:    mechanical AVR    Subjective/Observations:     No events overnight resting comfortably in bed.  No complaints of chest pain, dyspnea, or palpitations reported. No signs of orthopnea or PND.  + abdominal discomfort     REVIEW OF SYSTEMS: All other review of systems is negative unless indicated above    PAST MEDICAL & SURGICAL HISTORY:  Hypertension  Hyperlipidemia  Artificial pacemaker  H/O heart valve replacement with mechanical valve      MEDICATIONS  (STANDING):  metoprolol tartrate 100 milliGRAM(s) Oral two times a day  pantoprazole  Injectable 40 milliGRAM(s) IV Push two times a day  piperacillin/tazobactam IVPB.. 3.375 Gram(s) IV Intermittent every 8 hours  sodium chloride 0.9%. 1000 milliLiter(s) (150 mL/Hr) IV Continuous <Continuous>    MEDICATIONS  (PRN):  acetaminophen    Suspension .. 650 milliGRAM(s) Oral every 6 hours PRN Mild Pain (1 - 3)  morphine  - Injectable 4 milliGRAM(s) IV Push every 6 hours PRN Severe Pain (7 - 10)  morphine  - Injectable 1 milliGRAM(s) IV Push every 6 hours PRN Moderate Pain (4 - 6)  morphine  - Injectable 2 milliGRAM(s) IV Push every 6 hours PRN Severe Pain (7 - 10)  ondansetron Injectable 4 milliGRAM(s) IV Push every 6 hours PRN Nausea      Allergies    sulfa drugs (Rash)    Intolerances        Vital Signs Last 24 Hrs  T(C): 36.7 (23 Jan 2020 07:54), Max: 37.3 (22 Jan 2020 15:43)  T(F): 98.1 (23 Jan 2020 07:54), Max: 99.1 (22 Jan 2020 15:43)  HR: 70 (23 Jan 2020 07:54) (69 - 71)  BP: 143/71 (23 Jan 2020 07:54) (133/72 - 161/68)  BP(mean): --  RR: 18 (23 Jan 2020 07:54) (17 - 18)  SpO2: 95% (23 Jan 2020 07:54) (94% - 98%)    I&O's Summary    22 Jan 2020 07:01  -  23 Jan 2020 07:00  --------------------------------------------------------  IN: 3000 mL / OUT: 0 mL / NET: 3000 mL          PHYSICAL EXAM:  TELE: not on tele   Constitutional: NAD, awake and alert, well-developed  HEENT: Moist Mucous Membranes, Anicteric  Pulmonary: Non-labored, breath sounds are clear bilaterally, No wheezing, crackles or rhonchi  Cardiovascular: Regular, S1 and S2 nl,+ mech valve click   Gastrointestinal: Bowel Sounds present, soft, tender   Lymph: No lymphadenopathy. No peripheral edema.  Skin: No visible rashes or ulcers.  Psych:  Mood & affect appropriate    LABS: All Labs Reviewed:                        10.9   8.56  )-----------( 184      ( 23 Jan 2020 07:11 )             34.6                         11.0   10.64 )-----------( 168      ( 22 Jan 2020 07:18 )             34.6                         12.4   14.96 )-----------( 235      ( 21 Jan 2020 06:42 )             39.1     23 Jan 2020 07:11    139    |  109    |  9      ----------------------------<  69     4.0     |  23     |  0.56   22 Jan 2020 07:18    140    |  110    |  12     ----------------------------<  83     3.4     |  23     |  0.66   21 Jan 2020 06:42    144    |  112    |  14     ----------------------------<  109    4.1     |  25     |  0.92     Ca    7.7        23 Jan 2020 07:11  Ca    7.4        22 Jan 2020 07:18  Ca    7.2        21 Jan 2020 06:42  Phos  1.4       23 Jan 2020 07:11  Mg     1.8       23 Jan 2020 07:11    TPro  5.7    /  Alb  2.4    /  TBili  1.3    /  DBili  x      /  AST  29     /  ALT  68     /  AlkPhos  125    23 Jan 2020 07:11  TPro  5.5    /  Alb  2.4    /  TBili  1.8    /  DBili  x      /  AST  45     /  ALT  93     /  AlkPhos  133    22 Jan 2020 07:18  TPro  5.7    /  Alb  2.8    /  TBili  1.2    /  DBili  x      /  AST  121    /  ALT  165    /  AlkPhos  171    21 Jan 2020 06:42    PT/INR - ( 23 Jan 2020 07:11 )   PT: 29.9 sec;   INR: 2.57 ratio         PTT - ( 23 Jan 2020 07:11 )  PTT:37.4 sec         ECG:  < from: 12 Lead ECG (01.20.20 @ 08:00) >    Ventricular Rate 70 BPM    Atrial Rate 70 BPM    P-R Interval 236 ms    QRS Duration 148 ms    Q-T Interval 446 ms    QTC Calculation(Bezet) 481 ms    P Axis 35 degrees    R Axis -64 degrees    T Axis 111 degrees    Diagnosis Line AV dual-paced rhythm with prolonged AV conduction  Abnormal ECG  When compared with ECG of 20-JAN-2020 07:59, (Unconfirmed)  No significant change was found    < end of copied text >      Echo:      Radiology:  < from: Xray Chest 1 View AP/PA (01.20.20 @ 08:27) >    COMPARISON: None available.    Heart is magnified by technique.    Sternotomy and left-sided pacemaker noted.    Lungs are unremarkable.    IMPRESSION: Sternotomy and left-sided pacemaker.    < end of copied text > [facility] -- Goran Kaminski, Jason, Carlos, Elliott Thomas Savella  Office # [phone]      Follow Up:    mechanical AVR    Subjective/Observations:     No events overnight resting comfortably in bed.  No complaints of chest pain, dyspnea, or palpitations reported. No signs of orthopnea or PND.  + abdominal discomfort     REVIEW OF SYSTEMS: All other review of systems is negative unless indicated above    PAST MEDICAL & SURGICAL HISTORY:  Hypertension  Hyperlipidemia  Artificial pacemaker  H/O heart valve replacement with mechanical valve      MEDICATIONS  (STANDING):  metoprolol tartrate 100 milliGRAM(s) Oral two times a day  pantoprazole  Injectable 40 milliGRAM(s) IV Push two times a day  piperacillin/tazobactam IVPB.. 3.375 Gram(s) IV Intermittent every 8 hours  sodium chloride 0.9%. 1000 milliLiter(s) (150 mL/Hr) IV Continuous <Continuous>    MEDICATIONS  (PRN):  acetaminophen    Suspension .. 650 milliGRAM(s) Oral every 6 hours PRN Mild Pain (1 - 3)  morphine  - Injectable 4 milliGRAM(s) IV Push every 6 hours PRN Severe Pain (7 - 10)  morphine  - Injectable 1 milliGRAM(s) IV Push every 6 hours PRN Moderate Pain (4 - 6)  morphine  - Injectable 2 milliGRAM(s) IV Push every 6 hours PRN Severe Pain (7 - 10)  ondansetron Injectable 4 milliGRAM(s) IV Push every 6 hours PRN Nausea      Allergies    sulfa drugs (Rash)    Intolerances        Vital Signs Last 24 Hrs  T(C): 36.7 (23 Jan 2020 07:54), Max: 37.3 (22 Jan 2020 15:43)  T(F): 98.1 (23 Jan 2020 07:54), Max: 99.1 (22 Jan 2020 15:43)  HR: 70 (23 Jan 2020 07:54) (69 - 71)  BP: 143/71 (23 Jan 2020 07:54) (133/72 - 161/68)  BP(mean): --  RR: 18 (23 Jan 2020 07:54) (17 - 18)  SpO2: 95% (23 Jan 2020 07:54) (94% - 98%)    I&O's Summary    22 Jan 2020 07:01  -  23 Jan 2020 07:00  --------------------------------------------------------  IN: 3000 mL / OUT: 0 mL / NET: 3000 mL          PHYSICAL EXAM:  TELE: not on tele   Constitutional: NAD, awake    HEENT: Moist Mucous Membranes, Anicteric  Pulmonary: Non-labored, breath sounds are clear bilaterally, No wheezing, crackles or rhonchi  Cardiovascular: Regular, S1 and S2 nl,+ mech valve click   Gastrointestinal: Bowel Sounds present, soft, tender   Lymph: No lymphadenopathy. No peripheral edema.  Skin: No visible rashes or ulcers.  Psych:  Mood & affect appropriate    LABS: All Labs Reviewed:                        10.9   8.56  )-----------( 184      ( 23 Jan 2020 07:11 )             34.6                         11.0   10.64 )-----------( 168      ( 22 Jan 2020 07:18 )             34.6                         12.4   14.96 )-----------( 235      ( 21 Jan 2020 06:42 )             39.1     23 Jan 2020 07:11    139    |  109    |  9      ----------------------------<  69     4.0     |  23     |  0.56   22 Jan 2020 07:18    140    |  110    |  12     ----------------------------<  83     3.4     |  23     |  0.66   21 Jan 2020 06:42    144    |  112    |  14     ----------------------------<  109    4.1     |  25     |  0.92     Ca    7.7        23 Jan 2020 07:11  Ca    7.4        22 Jan 2020 07:18  Ca    7.2        21 Jan 2020 06:42  Phos  1.4       23 Jan 2020 07:11  Mg     1.8       23 Jan 2020 07:11    TPro  5.7    /  Alb  2.4    /  TBili  1.3    /  DBili  x      /  AST  29     /  ALT  68     /  AlkPhos  125    23 Jan 2020 07:11  TPro  5.5    /  Alb  2.4    /  TBili  1.8    /  DBili  x      /  AST  45     /  ALT  93     /  AlkPhos  133    22 Jan 2020 07:18  TPro  5.7    /  Alb  2.8    /  TBili  1.2    /  DBili  x      /  AST  121    /  ALT  165    /  AlkPhos  171    21 Jan 2020 06:42    PT/INR - ( 23 Jan 2020 07:11 )   PT: 29.9 sec;   INR: 2.57 ratio         PTT - ( 23 Jan 2020 07:11 )  PTT:37.4 sec         ECG:  < from: 12 Lead ECG (01.20.20 @ 08:00) >    Ventricular Rate 70 BPM    Atrial Rate 70 BPM    P-R Interval 236 ms    QRS Duration 148 ms    Q-T Interval 446 ms    QTC Calculation(Bezet) 481 ms    P Axis 35 degrees    R Axis -64 degrees    T Axis 111 degrees    Diagnosis Line AV dual-paced rhythm with prolonged AV conduction  Abnormal ECG  When compared with ECG of 20-JAN-2020 07:59, (Unconfirmed)  No significant change was found    < end of copied text >      Echo:      Radiology:  < from: Xray Chest 1 View AP/PA (01.20.20 @ 08:27) >    COMPARISON: None available.    Heart is magnified by technique.    Sternotomy and left-sided pacemaker noted.    Lungs are unremarkable.    IMPRESSION: Sternotomy and left-sided pacemaker.    < end of copied text > no

## 2023-03-30 NOTE — BRIEF OPERATIVE NOTE - NSICDXBRIEFPREOP_GEN_ALL_CORE_FT
PRE-OP DIAGNOSIS:  Proliferative vitreoretinopathy, left 30-Mar-2023 16:31:03  Forrest Fung  Vitreous hemorrhage, left eye 30-Mar-2023 16:29:50  Forrest Fung

## 2023-03-30 NOTE — BRIEF OPERATIVE NOTE - NSICDXBRIEFOPLAUNCH_GEN_ALL_CORE
Discussed with Dr Elio Villagran  Will do labs in infusion center today along with IV lasix 120 mg   Then, weekly he will come to infusion center for IV lasix 120 mg x 1 with infusion of lasix 20 mg/hr for 4 hours with blood work  Will consider adding aldactone in follow up visit next week  Patient taken to infusion center    >40 minutes spent in reviewing, examining and coordinating with Infusion center plan of care and treatment.
<--- Click to Launch ICDx for PreOp, PostOp and Procedure

## 2023-03-30 NOTE — ASU PATIENT PROFILE, ADULT - FALL HARM RISK - UNIVERSAL INTERVENTIONS
Bed in lowest position, wheels locked, appropriate side rails in place/Call bell, personal items and telephone in reach/Instruct patient to call for assistance before getting out of bed or chair/Non-slip footwear when patient is out of bed/[place] to call system/Physically safe environment - no spills, clutter or unnecessary equipment/Purposeful Proactive Rounding/Room/bathroom lighting operational, light cord in reach

## 2023-03-30 NOTE — BRIEF OPERATIVE NOTE - NSICDXBRIEFPOSTOP_GEN_ALL_CORE_FT
POST-OP DIAGNOSIS:  Vitreous hemorrhage, left eye 30-Mar-2023 16:31:27  Forrest Fung  Proliferative vitreoretinopathy, left 30-Mar-2023 16:31:10  Forrest Fung

## 2023-03-31 ENCOUNTER — NON-APPOINTMENT (OUTPATIENT)
Age: 78
End: 2023-03-31

## 2023-03-31 ENCOUNTER — RESULT REVIEW (OUTPATIENT)
Age: 78
End: 2023-03-31

## 2023-03-31 ENCOUNTER — TRANSCRIPTION ENCOUNTER (OUTPATIENT)
Age: 78
End: 2023-03-31

## 2023-03-31 ENCOUNTER — APPOINTMENT (OUTPATIENT)
Dept: OPHTHALMOLOGY | Facility: AMBULATORY SURGERY CENTER | Age: 78
End: 2023-03-31

## 2023-03-31 ENCOUNTER — OUTPATIENT (OUTPATIENT)
Dept: OUTPATIENT SERVICES | Facility: HOSPITAL | Age: 78
LOS: 1 days | Discharge: HOME | End: 2023-03-31
Payer: MEDICARE

## 2023-03-31 VITALS
TEMPERATURE: 97 F | RESPIRATION RATE: 16 BRPM | OXYGEN SATURATION: 98 % | DIASTOLIC BLOOD PRESSURE: 59 MMHG | SYSTOLIC BLOOD PRESSURE: 101 MMHG | HEART RATE: 57 BPM

## 2023-03-31 VITALS
HEIGHT: 64.5 IN | OXYGEN SATURATION: 100 % | TEMPERATURE: 100 F | HEART RATE: 59 BPM | RESPIRATION RATE: 16 BRPM | DIASTOLIC BLOOD PRESSURE: 66 MMHG | WEIGHT: 86.86 LBS | SYSTOLIC BLOOD PRESSURE: 118 MMHG

## 2023-03-31 DIAGNOSIS — S62.102A FRACTURE OF UNSPECIFIED CARPAL BONE, LEFT WRIST, INITIAL ENCOUNTER FOR CLOSED FRACTURE: Chronic | ICD-10-CM

## 2023-03-31 DIAGNOSIS — R91.8 OTHER NONSPECIFIC ABNORMAL FINDING OF LUNG FIELD: Chronic | ICD-10-CM

## 2023-03-31 DIAGNOSIS — Z98.890 OTHER SPECIFIED POSTPROCEDURAL STATES: Chronic | ICD-10-CM

## 2023-03-31 DIAGNOSIS — T86.8412 CORNEAL TRANSPLANT FAILURE, LEFT EYE: Chronic | ICD-10-CM

## 2023-03-31 PROCEDURE — 88300 SURGICAL PATH GROSS: CPT | Mod: 26

## 2023-03-31 PROCEDURE — 67113 REPAIR RETINAL DETACH CPLX: CPT | Mod: 78,LT

## 2023-03-31 PROCEDURE — 65920 REMOVE IMPLANT OF EYE: CPT | Mod: 78,LT

## 2023-03-31 DEVICE — SOL PERFLOURON 5ML: Type: IMPLANTABLE DEVICE | Site: LEFT | Status: FUNCTIONAL

## 2023-03-31 DEVICE — LASER PROBE 25G CONSTELLATION: Type: IMPLANTABLE DEVICE | Site: LEFT | Status: FUNCTIONAL

## 2023-03-31 DEVICE — IMP SIL SLEEVE STYLE 70: Type: IMPLANTABLE DEVICE | Site: LEFT | Status: FUNCTIONAL

## 2023-03-31 DEVICE — PERFLUORON 7ML KIT: Type: IMPLANTABLE DEVICE | Site: LEFT | Status: FUNCTIONAL

## 2023-03-31 DEVICE — RETINAL  ADATOSIL OIL 10CC: Type: IMPLANTABLE DEVICE | Site: LEFT | Status: FUNCTIONAL

## 2023-03-31 DEVICE — COMP SCLERAL BUCKLE NO 41: Type: IMPLANTABLE DEVICE | Site: LEFT | Status: FUNCTIONAL

## 2023-03-31 RX ORDER — TROPICAMIDE 1 %
1 DROPS OPHTHALMIC (EYE)
Refills: 0 | Status: COMPLETED | OUTPATIENT
Start: 2023-03-31 | End: 2023-03-31

## 2023-03-31 RX ORDER — OFLOXACIN 0.3 %
1 DROPS OPHTHALMIC (EYE)
Refills: 0 | Status: COMPLETED | OUTPATIENT
Start: 2023-03-31 | End: 2023-03-31

## 2023-03-31 RX ORDER — PHENYLEPHRINE HCL 2.5 %
1 DROPS OPHTHALMIC (EYE)
Refills: 0 | Status: COMPLETED | OUTPATIENT
Start: 2023-03-31 | End: 2023-03-31

## 2023-03-31 RX ORDER — HYDROXYCHLOROQUINE SULFATE 200 MG
1 TABLET ORAL
Qty: 0 | Refills: 0 | DISCHARGE

## 2023-03-31 RX ORDER — CYCLOPENTOLATE HYDROCHLORIDE 10 MG/ML
1 SOLUTION/ DROPS OPHTHALMIC
Refills: 0 | Status: COMPLETED | OUTPATIENT
Start: 2023-03-31 | End: 2023-03-31

## 2023-03-31 RX ORDER — ACETAMINOPHEN 500 MG
650 TABLET ORAL ONCE
Refills: 0 | Status: DISCONTINUED | OUTPATIENT
Start: 2023-03-31 | End: 2023-03-31

## 2023-03-31 RX ADMIN — Medication 1 DROP(S): at 07:52

## 2023-03-31 RX ADMIN — CYCLOPENTOLATE HYDROCHLORIDE 1 DROP(S): 10 SOLUTION/ DROPS OPHTHALMIC at 07:51

## 2023-03-31 RX ADMIN — Medication 1 DROP(S): at 08:01

## 2023-03-31 RX ADMIN — Medication 1 DROP(S): at 08:12

## 2023-03-31 RX ADMIN — Medication 1 DROP(S): at 08:13

## 2023-03-31 RX ADMIN — CYCLOPENTOLATE HYDROCHLORIDE 1 DROP(S): 10 SOLUTION/ DROPS OPHTHALMIC at 08:13

## 2023-03-31 RX ADMIN — Medication 1 DROP(S): at 08:02

## 2023-03-31 RX ADMIN — CYCLOPENTOLATE HYDROCHLORIDE 1 DROP(S): 10 SOLUTION/ DROPS OPHTHALMIC at 08:01

## 2023-03-31 RX ADMIN — Medication 1 DROP(S): at 07:51

## 2023-03-31 NOTE — ASU DISCHARGE PLAN (ADULT/PEDIATRIC) - NS MD DC FALL RISK RISK
For information on Fall & Injury Prevention, visit: [email or web address] OR  [email or web address] OR  https://www.cdc.gov/steadi/patient.html

## 2023-03-31 NOTE — ASU PREOP CHECKLIST - WAS PATIENT ON BETA BLOCKER?
Diabetes/ Glycemic Control Plan of Care  Recommendations:   1. Continue 15 units lantus/day and corrective lispro, normal insulin sensitivity ACHS  2. On-going diabetes educations - see diabetes education record for details. 3. For OP diabetes medications, suggest daily basal insulin, dose to be determined based on current needs. Also recommend pt to use insulin pens to facilitate compliance. Pt will also need prescription for insulin pen needles:     · BD Ultra-Fine Kristal Pen needles (100 count box) 4 mm (5/32\") x 32 G  [facility] [id number]    4. Pt provided with Contour Next EZ glucometer and 20 test strips, 20 lancets to tie him over until he can obtain a prescription for lancets and test strips. 5. For test strips the script should read:   · 3 test strips/3 lancets per day  · 100 test strips/100 lancets per month       Assessment:  Diabetes management consult received to assess home management, OP education, hospital BG management, medication recommendations, insulin and meter education. Pt admitted with known history of T2DM (poorly controlled, A1C > 14.0%; home regimen of SLGT2-i and metformin with poor compliance). Pt reports having T2DM for about 6 years. He states he was started on Jardiance about 4 months ago but did not take it q day because \"it did not work\". He was also supposed to take metformin but looked it up on the Internet, decided it was \"bad for you\" and did not take it until being discharged from from [facility] on 2/26/22. He states he took it [place] time\" and it made him \"feel weak\". He reports he has a glucometer at home with about 6 test strips left. He states he checks his BG \"on the weekends, sometimes\" and his readings ranged from 200 and into the 400's. He does not know the brand of his meter. He desires to obtain and Freestyle Kaley CGM in the future. He reports he has a PCP with [place] \"at a clinic\" but cannot remember the doctor's last name.     Per chart review pt has refused to take insulin in the past  but is now receptive. Started diabetes education including insulin pen and BG monitoring education. Pt appears to have misinformation regarding diabetes medications. Pt was started on Lantus last night (15 units) and fasting blood glucose this morning - 150 mg/dL, in target range. DX: No diagnosis found. Fasting/ Morning blood glucose:   Lab Results   Component Value Date/Time    Glucose 262 (H) 02/28/2022 02:28 AM    Glucose (POC) 150 (H) 02/28/2022 06:57 AM     IV Fluids containing dextrose: none  Steroids: none      Blood glucose values:   2/28:  Lab - 262;  POC - 150  2/27:  Lab - 351;  POC - 308, 265, 435/446    Within target range (70-180mg/dL):  no but progressing    Current insulin orders:   15 units Lantus q HS  Corrective lispro, normal insulin sensitivity ACHS    Total Daily Dose previous 24 hours = not here for full day  Current A1c:   Lab Results   Component Value Date/Time    Hemoglobin A1c >14.0 (H) 02/27/2022 09:30 AM      equivalent  to ave Blood Glucose of > 360  mg/dl for 2-3 months prior to admission  Adequate glycemic control PTA: NO  Nutrition/Diet:   Active Orders   Diet    ADULT DIET Regular; 3 carb choices (45 gm/meal); Low Sodium (2 gm)      Meal Intake:  Patient Vitals for the past 168 hrs:   % Diet Eaten   02/27/22 1934 51 - 75%     Supplement Intake:  No data found. Home diabetes medications:  Pt reports he took metformin one time (on 2/2  Key Antihyperglycemic Medications             metFORMIN ER (GLUCOPHAGE XR) 500 mg tablet (Taking) Take 500 mg by mouth daily. empagliflozin (Jardiance) 10 mg tablet Take 10 mg by mouth daily as needed. Plan/Goals:   Blood glucose will be within target of 70 - 180 mg/dl within 72 hours  Reinforce dietary and medication compliance at home.        Education:  [x] Refer to Diabetes Education Record                       [] Education not indicated at this time     Charbel Diaz MS, RD, CDCES  Diabetes and Glycemic Control   [employer] No

## 2023-04-01 ENCOUNTER — APPOINTMENT (OUTPATIENT)
Dept: OPHTHALMOLOGY | Facility: CLINIC | Age: 78
End: 2023-04-01
Payer: MEDICARE

## 2023-04-01 ENCOUNTER — NON-APPOINTMENT (OUTPATIENT)
Age: 78
End: 2023-04-01

## 2023-04-01 PROCEDURE — 99024 POSTOP FOLLOW-UP VISIT: CPT

## 2023-04-04 ENCOUNTER — NON-APPOINTMENT (OUTPATIENT)
Age: 78
End: 2023-04-04

## 2023-04-04 ENCOUNTER — APPOINTMENT (OUTPATIENT)
Dept: OPHTHALMOLOGY | Facility: CLINIC | Age: 78
End: 2023-04-04
Payer: MEDICARE

## 2023-04-04 PROCEDURE — 99024 POSTOP FOLLOW-UP VISIT: CPT

## 2023-04-06 ENCOUNTER — APPOINTMENT (OUTPATIENT)
Dept: OPHTHALMOLOGY | Facility: CLINIC | Age: 78
End: 2023-04-06
Payer: MEDICARE

## 2023-04-06 ENCOUNTER — NON-APPOINTMENT (OUTPATIENT)
Age: 78
End: 2023-04-06

## 2023-04-06 PROCEDURE — 99024 POSTOP FOLLOW-UP VISIT: CPT

## 2023-04-06 PROCEDURE — 92134 CPTRZ OPH DX IMG PST SGM RTA: CPT

## 2023-04-07 LAB — SURGICAL PATHOLOGY STUDY: SIGNIFICANT CHANGE UP

## 2023-04-07 NOTE — OPERATIVE REPORT - OPERATIVE RPOSRT DETAILS
PREOPERATIVE DIAGNOSIS: IOL removal Right Eye    POSTOPERATIVE DIAGNOSIS: IOL Removal Right Eye    OPERATIVE PROCEDURE: Intraocular lens removal Right eye     PROCEDURE:  The patient was brought to the operating room and placed supine on the operating room table. After uneventful induction of neuroleptic anesthesia, a retrobulbar block consisting of 5-7 cc of 2% lidocaine and 0.75% marcaine was administered around the right eye. A modified van Lint style lid block was also given.  This procedure is going to be performed with an assistant surgeon whose extra hands are required to properly manage the surgery. Dr Fung performed a Pars plana vitrectomy and retinal detachment repair.      Healon was instilled to deepen the anterior chamber.  A keratome was used to create a 3.25-mm clear corneal wound superiorly.  The haptics were released from the sclera by cutting the ends with van julian scissors. An MST scissor was then used to cut half way across the optic to create a “pac man” configuration. The IOL was then grasped with a .12 forceps and carefully rotated out of the wound in a clockwise direction. Once the IOL was removed the anterior chamber was refilled with viscoelastic.  Once complete, the pars plana infusion and any remaining ports from the retinal surgeon were removed.  The wounds remained watertight. An antibiotic/steroid mixture was irrigated into the conjunctival cul de sac. The lid speculum was removed from the eye and a shield placed over the eye. The patient was brought into the recovery room in excellent condition.

## 2023-04-12 ENCOUNTER — APPOINTMENT (OUTPATIENT)
Dept: OPHTHALMOLOGY | Facility: CLINIC | Age: 78
End: 2023-04-12

## 2023-04-12 ENCOUNTER — NON-APPOINTMENT (OUTPATIENT)
Age: 78
End: 2023-04-12

## 2023-04-12 ENCOUNTER — APPOINTMENT (OUTPATIENT)
Dept: OPHTHALMOLOGY | Facility: CLINIC | Age: 78
End: 2023-04-12
Payer: MEDICARE

## 2023-04-12 PROCEDURE — 92134 CPTRZ OPH DX IMG PST SGM RTA: CPT

## 2023-04-12 PROCEDURE — 99024 POSTOP FOLLOW-UP VISIT: CPT

## 2023-04-12 PROCEDURE — 67820 REVISE EYELASHES: CPT | Mod: 79,LT

## 2023-04-20 ENCOUNTER — APPOINTMENT (OUTPATIENT)
Dept: OPHTHALMOLOGY | Facility: CLINIC | Age: 78
End: 2023-04-20
Payer: MEDICARE

## 2023-04-20 ENCOUNTER — APPOINTMENT (OUTPATIENT)
Dept: OPHTHALMOLOGY | Facility: CLINIC | Age: 78
End: 2023-04-20

## 2023-04-20 ENCOUNTER — NON-APPOINTMENT (OUTPATIENT)
Age: 78
End: 2023-04-20

## 2023-04-20 PROCEDURE — 67515 INJECT/TREAT EYE SOCKET: CPT | Mod: 79,LT

## 2023-04-20 PROCEDURE — 99024 POSTOP FOLLOW-UP VISIT: CPT

## 2023-04-21 ENCOUNTER — APPOINTMENT (OUTPATIENT)
Dept: OPHTHALMOLOGY | Facility: CLINIC | Age: 78
End: 2023-04-21
Payer: MEDICARE

## 2023-04-21 ENCOUNTER — NON-APPOINTMENT (OUTPATIENT)
Age: 78
End: 2023-04-21

## 2023-04-21 PROCEDURE — 99024 POSTOP FOLLOW-UP VISIT: CPT

## 2023-04-21 PROCEDURE — 92071 CONTACT LENS FITTING FOR TX: CPT | Mod: LT

## 2023-04-28 ENCOUNTER — NON-APPOINTMENT (OUTPATIENT)
Age: 78
End: 2023-04-28

## 2023-04-28 ENCOUNTER — APPOINTMENT (OUTPATIENT)
Dept: OPHTHALMOLOGY | Facility: CLINIC | Age: 78
End: 2023-04-28
Payer: MEDICARE

## 2023-04-28 PROCEDURE — 99024 POSTOP FOLLOW-UP VISIT: CPT

## 2023-05-02 NOTE — ASU DISCHARGE PLAN (ADULT/PEDIATRIC) - HISTORY OF COVID-19 VACCINATION
Vaccine status unknown Island Pedicle Flap With Canthal Suspension Text: The defect edges were debeveled with a #15 scalpel blade.  Given the location of the defect, shape of the defect and the proximity to free margins an island pedicle advancement flap was deemed most appropriate.  Using a sterile surgical marker, an appropriate advancement flap was drawn incorporating the defect, outlining the appropriate donor tissue and placing the expected incisions within the relaxed skin tension lines where possible. The area thus outlined was incised deep to adipose tissue with a #15 scalpel blade.  The skin margins were undermined to an appropriate distance in all directions around the primary defect and laterally outward around the island pedicle utilizing iris scissors.  There was minimal undermining beneath the pedicle flap. A suspension suture was placed in the canthal tendon to prevent tension and prevent ectropion.

## 2023-05-04 ENCOUNTER — NON-APPOINTMENT (OUTPATIENT)
Age: 78
End: 2023-05-04

## 2023-05-04 ENCOUNTER — APPOINTMENT (OUTPATIENT)
Dept: OPHTHALMOLOGY | Facility: CLINIC | Age: 78
End: 2023-05-04
Payer: MEDICARE

## 2023-05-04 PROCEDURE — 99024 POSTOP FOLLOW-UP VISIT: CPT

## 2023-05-04 PROCEDURE — 92134 CPTRZ OPH DX IMG PST SGM RTA: CPT

## 2023-05-10 ENCOUNTER — APPOINTMENT (OUTPATIENT)
Dept: OPHTHALMOLOGY | Facility: CLINIC | Age: 78
End: 2023-05-10
Payer: MEDICARE

## 2023-05-10 ENCOUNTER — NON-APPOINTMENT (OUTPATIENT)
Age: 78
End: 2023-05-10

## 2023-05-10 PROCEDURE — 99024 POSTOP FOLLOW-UP VISIT: CPT

## 2023-05-10 PROCEDURE — 92071 CONTACT LENS FITTING FOR TX: CPT | Mod: LT

## 2023-05-19 ENCOUNTER — APPOINTMENT (OUTPATIENT)
Dept: OPHTHALMOLOGY | Facility: CLINIC | Age: 78
End: 2023-05-19

## 2023-05-24 ENCOUNTER — APPOINTMENT (OUTPATIENT)
Dept: OPHTHALMOLOGY | Facility: CLINIC | Age: 78
End: 2023-05-24
Payer: MEDICARE

## 2023-05-24 ENCOUNTER — NON-APPOINTMENT (OUTPATIENT)
Age: 78
End: 2023-05-24

## 2023-05-24 PROCEDURE — 92071 CONTACT LENS FITTING FOR TX: CPT | Mod: LT

## 2023-05-24 PROCEDURE — 76512 OPH US DX B-SCAN: CPT | Mod: LT

## 2023-05-24 PROCEDURE — 99024 POSTOP FOLLOW-UP VISIT: CPT

## 2023-06-07 ENCOUNTER — NON-APPOINTMENT (OUTPATIENT)
Age: 78
End: 2023-06-07

## 2023-06-07 ENCOUNTER — APPOINTMENT (OUTPATIENT)
Dept: OPHTHALMOLOGY | Facility: CLINIC | Age: 78
End: 2023-06-07
Payer: MEDICARE

## 2023-06-07 PROCEDURE — 92071 CONTACT LENS FITTING FOR TX: CPT | Mod: LT

## 2023-06-07 PROCEDURE — 99024 POSTOP FOLLOW-UP VISIT: CPT

## 2023-06-19 ENCOUNTER — NON-APPOINTMENT (OUTPATIENT)
Age: 78
End: 2023-06-19

## 2023-06-19 ENCOUNTER — APPOINTMENT (OUTPATIENT)
Dept: OPHTHALMOLOGY | Facility: CLINIC | Age: 78
End: 2023-06-19
Payer: MEDICARE

## 2023-06-19 PROCEDURE — 99024 POSTOP FOLLOW-UP VISIT: CPT

## 2023-06-28 ENCOUNTER — APPOINTMENT (OUTPATIENT)
Dept: OPHTHALMOLOGY | Facility: CLINIC | Age: 78
End: 2023-06-28
Payer: MEDICARE

## 2023-06-28 ENCOUNTER — NON-APPOINTMENT (OUTPATIENT)
Age: 78
End: 2023-06-28

## 2023-06-28 PROCEDURE — 92012 INTRM OPH EXAM EST PATIENT: CPT

## 2023-06-28 PROCEDURE — 92071 CONTACT LENS FITTING FOR TX: CPT | Mod: LT

## 2023-06-28 PROCEDURE — 92134 CPTRZ OPH DX IMG PST SGM RTA: CPT

## 2023-07-19 ENCOUNTER — APPOINTMENT (OUTPATIENT)
Dept: OPHTHALMOLOGY | Facility: CLINIC | Age: 78
End: 2023-07-19
Payer: MEDICARE

## 2023-07-19 ENCOUNTER — NON-APPOINTMENT (OUTPATIENT)
Age: 78
End: 2023-07-19

## 2023-07-19 PROCEDURE — 92012 INTRM OPH EXAM EST PATIENT: CPT

## 2023-07-19 PROCEDURE — 92134 CPTRZ OPH DX IMG PST SGM RTA: CPT

## 2023-07-21 ENCOUNTER — APPOINTMENT (OUTPATIENT)
Dept: OPHTHALMOLOGY | Facility: CLINIC | Age: 78
End: 2023-07-21
Payer: MEDICARE

## 2023-07-21 ENCOUNTER — NON-APPOINTMENT (OUTPATIENT)
Age: 78
End: 2023-07-21

## 2023-07-21 PROCEDURE — 92071 CONTACT LENS FITTING FOR TX: CPT | Mod: LT

## 2023-08-21 ENCOUNTER — APPOINTMENT (OUTPATIENT)
Dept: OPHTHALMOLOGY | Facility: CLINIC | Age: 78
End: 2023-08-21
Payer: MEDICARE

## 2023-08-21 ENCOUNTER — NON-APPOINTMENT (OUTPATIENT)
Age: 78
End: 2023-08-21

## 2023-08-21 PROCEDURE — 92012 INTRM OPH EXAM EST PATIENT: CPT

## 2023-08-30 ENCOUNTER — APPOINTMENT (OUTPATIENT)
Dept: OPHTHALMOLOGY | Facility: CLINIC | Age: 78
End: 2023-08-30
Payer: MEDICARE

## 2023-08-30 ENCOUNTER — NON-APPOINTMENT (OUTPATIENT)
Age: 78
End: 2023-08-30

## 2023-08-30 PROCEDURE — 92012 INTRM OPH EXAM EST PATIENT: CPT

## 2023-08-30 PROCEDURE — 92134 CPTRZ OPH DX IMG PST SGM RTA: CPT

## 2023-09-08 NOTE — PRE-ANESTHESIA EVALUATION ADULT - HEART RATE (BEATS/MIN)
Date of Injury: 2019  Initial Treatment Date: 08/23/23  Date Last Seen: 09/06/2023  Mechanism of Onset: Other  Occupation: Retired  Referring by: Self  Primary Care Physician: Fitz Ibrahim MD  Date informed consent signed:  08/23/23  ABN: 08/23/23  I have reviewed the past medical history, family history, social history, medications and allergies listed in the medical record as obtained by my nursing staff and support staff and agree with their documentation.  Margarita  reports that she quit smoking about 53 years ago. Her smoking use included cigarettes. She started smoking about 65 years ago. She has a 12.4 pack-year smoking history. She has never used smokeless tobacco.    Margarita is allergic to cefpodoxime proxetil.   Advance Directive Status: Yes  Visit Number of Current Episode: 5    Chief Complaint   Patient presents with   • Office Visit   • Follow-up   • Back Pain     Low Back       Subjective: Margarita is a 77 year old female who comes in today for evaluation and treatment of Low Back pain with pain down both legs to the calfs. Patient rates her pain today at a 4/10 with 10 being the worst. Patient complains of Low Back pain today.  Patient states that the stabbing pain is gone, the pain is still there but it is different. The patient states that the pain is there but not all the time.  Initial visit history:  Patient rates her pain today at 6/10, at best pain is 0/10, and at worst pain is 10/10 with 10 being worst.  Patient is here today presenting with bilateral lower back pain that she relates is been present for the last 4-5 years .  She relates she initially fell backwards on some stones stairs .  Patient had noted at about 2 years ago her grandchild ran up to her when she bent down and she is had increased symptoms in the lower back since.  Patient complains of constant dull pain in the lower back that comes stabbing with movements such as vacuuming , standing in 1 place for too long and  climbing in and out of the truck.  She relates that she can have radiating symptoms of sharp pain down both legs to the calves but not the same time.  Patient relates that she will get fit tingling intermittently into the big toes of the feet and that her toes will curl up but she wonders if this is secondary to diabetic neuropathy .  Patient admits to positive shopping cart sign .  Patient reports increased pain with standing .  She relates using a drop later decreases her symptoms.  Previous MRI of the lumbar spine - see imaging section of this note for report findings.  Patient denies any pain with coughing, sneezing or straining to make a bowel movement.  The patient reports no loss of bowel or bladder control or any ominous signs.     Patient had previous bilateral knee replacement surgery.    Mechanism of Injury: Fell backwards down stone stairs    Other providers she has seen for this condition:  Pain management:  Patient has previously been seen by pain management most recently on 08/18/2021 by Kwaku Bradshaw MD. HIMA was planned but was not performed secondary to elevated blood pressure.    Physical therapy:  Yes.  4 visits the most recent being 10/01/2020.    Medications currently taking:   Current Outpatient Medications   Medication Sig Dispense Refill   • DISPENSE Please dispense Upright Standing Walker for patient due to impaired and decreased mobility. 1 each 0   • metFORMIN (GLUCOPHAGE) 500 MG tablet TAKE 1 TABLET BY MOUTH IN THE MORNING AND IN THE EVENING WITH MEALS 180 tablet 1   • atorvastatin (LIPITOR) 40 MG tablet TAKE 1 TABLET BY MOUTH DAILY 90 tablet 1   • lisinopril (ZESTRIL) 20 MG tablet TAKE 1 TABLET BY MOUTH TWICE DAILY 180 tablet 1   • hydroCHLOROthiazide (HYDRODIURIL) 25 MG tablet Take 1 tablet by mouth daily. 90 tablet 3   • amLODIPine (NORVASC) 10 MG tablet Take 1 tablet by mouth daily. 90 tablet 3   • nebivolol (BYSTOLIC) 5 MG tablet Take 1 tablet by mouth daily. (Patient taking  differently: Take 10 mg by mouth daily.) 90 tablet 3   • amoxicillin (AMOXIL) 500 MG capsule Take 500 mg by mouth as needed.     • blood glucose (GAYLE CONTOUR NEXT TEST) test strip Test blood sugar 1 times daily as directed. Diagnosis: E11.9. Meter: Contour Next 100 each 1   • MAGNESIUM PO      • DISPENSE Glucose meter. Please give patient meter that is covered under insurance. Diagnosis: E 11.9 1 each 0   • aspirin 81 MG chewable tablet Chew 81 mg by mouth daily.     • Cholecalciferol (VITAMIN D3) 5000 units capsule Take 5,000 Units by mouth.       No current facility-administered medications for this visit.       Patient Active Problem List   Diagnosis   • HTN (hypertension)   • Hyperlipidemia   • Body mass index (BMI) 40.0-44.9, adult   • Lumbar radiculopathy   • DM (diabetes mellitus), type 2 (CMD)   • Carotid artery disease (CMD)   • Chronic kidney disease (CKD)   • Somatic dysfunction of lumbar region   • Spinal stenosis of lumbar region without neurogenic claudication     Objective:    The patient is a 77 year old female who is pleasant, coherent x3, ambulatory under her own power and does not appear to be in visible distress.  Findings on the initial examination:  Patient's visit required  • Spinal Segmental Joint Restriction: L5 is PL, L4 and L3 is PL exhibited limited passive joint motion and segmental restriction with tenderness upon palpation.  • Extraspinal Joint Limitations/Restrictions: N/A.  • Isometric Manual Resistance indicates N/A were tender under tension resulting in antagonist pain patterns.  • Tissue Tone Changes: Soft tissue palpation revealed increased or decreased tone and tenderness in the following muscle groups: Right Multifidus.   • General Tenderness to Palpation is noted over N/A.      NEUROLOGICAL ASSESSMENT on 08/23/23:   Patient was of observed to be alert and oriented x3 (person place time) and cooperative.  Muscle testing +5 bilaterally in the lower extremities.  Deep tendon  reflexes +2 bilaterally in the lower extremities.    ORTHOPEDIC/NEUROLOGICAL TESTING on 08/23/23:  Figueroa Positive  Kemps Positive Right Left Lumbar Pain  Ely's Negative Bilateral  Nachlas Negative Bilateral  Hibb's Positive Bilateral Low Back  Yeoman's Negative Bilateral  Straight leg raise Negative Bilateral  Well leg raise Negative Bilateral  Heel Walk Test Negative  Toe Walk Test Negative    SMART BACK PHRASE QUESTIONNAIRE:  1. My back pain has spread down my leg(s) at some time in the past two weeks. Agree - 1  2.  I have had pain in the neck or shoulder at some time in the past two weeks. Disagree - 0   3.  I have only walked short distances because of my back pain. Agree - 1  4.  In the last 2 weeks, I have dressed more slowly than usual because of my back pain. Agree - 1  5.  It's not really safe for a person with a condition like mine to be physically active. Disagree - 0   6.  Worrying thoughts have been going through my mind a lot of the time. Disagree - 0   7.  I feel like my back pain is terrible and it is never going to get any better. Agree - 1  8.  In general I have not enjoyed all the things I used to enjoy. Agree - 1  9.  Overall, how bothersome has your back pain been in the last 2 weeks? Very Much    Total Score: 8  Sub-score (Q5-9): 5    3< (Low) , 4 with at least a sub-score of 3 (Medium), at least a sub-score of 4 (High)     Objective disability index score from the initial encounter for this episode is 65% using the Functional Rating Index questionnaire.    Patient emotional screening, DoD/VA Pain Supplemental Questionnaire score was 30/40.    Relevant Diagnostic Imaging/Testing:  Date and Time: Exam End: 7/16/2021 13:39 Status: Final result Provider Status: Reviewed  Priority: Routine   Study Result  Narrative & Impression  MRI LUMBAR SPINE WO CONTRAST   COMPARISON: November 2, 2017..   CLINICAL HISTORY:  Lumbar radiculopathy, cancer or infection suspected.   TECHNIQUE:  Sagittal T1, T2,  STIR, and in and out of phase; axial T1 and  T2.    CONTRAST: None.   FINDINGS:    Vertebra: The lumbar vertebral bodies are normal in height.   Marrow: No suspicious marrow signal.    Discs: Severe disc height loss at the lower thoracic spine through T12-L1.  Moderate disc degeneration L1-2 through L3-4 with mild degeneration at the  lower lumbar spine.   Alignment: Leftward convex curvature apex L2. Grade 1 anterolisthesis of L3  on L4.   Spinal cord: A normal-appearing conus medullaris terminates at the L1-2  level. The cauda equina and filum terminale are normal.   Imaged extraspinal soft tissues: Negative.   Level analysis:   T12-L1: Mild disc bulge and facet arthropathy. No stenosis.   L1-L2:  Minimal disc bulge. Mild facet arthropathy. No stenosis.   L2-L3:   Minimal disc bulge. Mild facet arthropathy and ligamentum flavum  thickening. No stenosis.   L3-L4:   Moderate disc bulge, anterolisthesis, moderate facet arthropathy  and moderate ligamentum flavum thickening. Moderate canal stenosis. No  significant foraminal narrowing.   L4-L5:   Left subarticular shallow disc protrusion partially effacing the  left lateral recess and contacting the traversing left L5 nerve root. Mild  disc bulge. Moderate facet arthropathy and ligamentum flavum thickening.  Moderate canal stenosis. Mild bilateral foraminal narrowing.   L5-S1:   Bilateral facet arthropathy. No stenosis.   IMPRESSION:     1. L4-5 left subarticular shallow disc protrusion contacting the traversing  left L5 nerve root.   2. Degenerative changes most notably at the lower lumbar spine. Moderate  canal stenosis at L3-4 and L4-5 with mild foraminal narrowing at L4-5.    Assessment:  Patient is here today with low back pain. Patient states the stabbing pain is gone and that the pain is there but not all the time. Lumbar Segmental Dysfunction.  1. Somatic dysfunction of lumbar region    2. Spinal stenosis of lumbar region without neurogenic claudication         Complicating Factors/Comorbidities:  Increased BMI.  Multilevel degenerative change.  Central canal stenosis.  Neural foraminal stenosis.  Diabetes.  Anterolisthesis.  Anatomical change with altered gait secondary to previous knee replacement.    Plan:  Patient was evaluated and treated with Vargas distraction/decompression was applied to the Lumbar spine at the levels listed in the objective. Treatment was well tolerated.     Patient was provided on name of a chiropractor in [place] for when they snow bird back to their other home: Kwaku Arias [place].    Chiropractic manipulative therapy (CMT) is utilized to reduce fixation and increase range of motion in restricted vertebral units.  Vargas distraction/decompression is utilized to decrease intervertebral disc compression, promote imbibition as well as reduce fixation increased range of motion restricted vertebral units.    Cross friction soft tissue work to reduce muscle tension was applied to the N/A musculature.  Proprioceptive nerve facilitated stretching to provide muscle elongation was applied to N/A.    Therapeutic Exercise/Rehab/Modalities performed today:  None    Patient Instruction/Education:  Cryotherapy may be applied 15 minutes per hour as needed to reduce pain and inflammation. Daily the patient should perform abdominal bracing to provide core stabilization, and hip hinging to increase hip flexibility to help avoid extension.  Patient was instructed that she should experience any pain performing the exercises/stretches she should stop and discontinue. Patient stated understanding of, and was in agreement with, the discussed instructions.    Goals of Care: Goal of care is to improve muscular and skeletal function and provide symptom relief.  Short term goals are 25% reduction in symptoms over the next 2 weeks and 50% over the next 4 weeks.     Other treatment options were not discussed at this time.  Patient does live in [place] for  a portion of the year and I informed her that I have a colleague that performs the Vargas technique in [place] that will give her the name of for continuity of care.    Patient rates her pain post treatment at was rated at a 4/10 with 10 being the worst.   Patient is to return next week for continued care and treatment of her condition consistent with plan of care.    On 09/08/2023, Kristina CURIEL CT scribed the services personally performed by Dr. Marciano Lora.       Marciano CURIEL DC state the documentation recorded by the scribe accurately and completely reflects the service(s) I personally performed and the decisions made by me.    64

## 2023-09-26 ENCOUNTER — APPOINTMENT (OUTPATIENT)
Dept: OPHTHALMOLOGY | Facility: CLINIC | Age: 78
End: 2023-09-26
Payer: MEDICARE

## 2023-09-26 ENCOUNTER — NON-APPOINTMENT (OUTPATIENT)
Age: 78
End: 2023-09-26

## 2023-09-26 PROCEDURE — 92012 INTRM OPH EXAM EST PATIENT: CPT | Mod: 25

## 2023-09-26 PROCEDURE — 92071 CONTACT LENS FITTING FOR TX: CPT | Mod: LT

## 2023-09-27 ENCOUNTER — APPOINTMENT (OUTPATIENT)
Dept: OPHTHALMOLOGY | Facility: CLINIC | Age: 78
End: 2023-09-27

## 2023-10-25 ENCOUNTER — APPOINTMENT (OUTPATIENT)
Dept: OPHTHALMOLOGY | Facility: CLINIC | Age: 78
End: 2023-10-25
Payer: MEDICARE

## 2023-10-25 ENCOUNTER — NON-APPOINTMENT (OUTPATIENT)
Age: 78
End: 2023-10-25

## 2023-10-25 PROCEDURE — 92012 INTRM OPH EXAM EST PATIENT: CPT

## 2023-10-25 PROCEDURE — 92134 CPTRZ OPH DX IMG PST SGM RTA: CPT

## 2023-10-31 ENCOUNTER — NON-APPOINTMENT (OUTPATIENT)
Age: 78
End: 2023-10-31

## 2023-10-31 ENCOUNTER — APPOINTMENT (OUTPATIENT)
Dept: OPHTHALMOLOGY | Facility: CLINIC | Age: 78
End: 2023-10-31
Payer: MEDICARE

## 2023-10-31 PROCEDURE — 92012 INTRM OPH EXAM EST PATIENT: CPT

## 2023-11-22 ENCOUNTER — NON-APPOINTMENT (OUTPATIENT)
Age: 78
End: 2023-11-22

## 2023-11-22 ENCOUNTER — APPOINTMENT (OUTPATIENT)
Dept: OPHTHALMOLOGY | Facility: CLINIC | Age: 78
End: 2023-11-22

## 2023-11-28 ENCOUNTER — NON-APPOINTMENT (OUTPATIENT)
Age: 78
End: 2023-11-28

## 2023-11-28 ENCOUNTER — APPOINTMENT (OUTPATIENT)
Dept: OPHTHALMOLOGY | Facility: CLINIC | Age: 78
End: 2023-11-28
Payer: MEDICARE

## 2023-11-28 PROCEDURE — 92012 INTRM OPH EXAM EST PATIENT: CPT

## 2023-12-01 ENCOUNTER — NON-APPOINTMENT (OUTPATIENT)
Age: 78
End: 2023-12-01

## 2023-12-01 ENCOUNTER — APPOINTMENT (OUTPATIENT)
Dept: OPHTHALMOLOGY | Facility: CLINIC | Age: 78
End: 2023-12-01
Payer: MEDICARE

## 2023-12-01 PROCEDURE — 92285 EXTERNAL OCULAR PHOTOGRAPHY: CPT

## 2023-12-01 PROCEDURE — 92012 INTRM OPH EXAM EST PATIENT: CPT

## 2023-12-05 ENCOUNTER — NON-APPOINTMENT (OUTPATIENT)
Age: 78
End: 2023-12-05

## 2023-12-05 ENCOUNTER — APPOINTMENT (OUTPATIENT)
Dept: OPHTHALMOLOGY | Facility: CLINIC | Age: 78
End: 2023-12-05
Payer: MEDICARE

## 2023-12-05 PROCEDURE — 92012 INTRM OPH EXAM EST PATIENT: CPT

## 2023-12-13 ENCOUNTER — NON-APPOINTMENT (OUTPATIENT)
Age: 78
End: 2023-12-13

## 2023-12-13 ENCOUNTER — APPOINTMENT (OUTPATIENT)
Dept: OPHTHALMOLOGY | Facility: CLINIC | Age: 78
End: 2023-12-13
Payer: MEDICARE

## 2023-12-13 PROCEDURE — 92012 INTRM OPH EXAM EST PATIENT: CPT

## 2023-12-20 ENCOUNTER — APPOINTMENT (OUTPATIENT)
Dept: OPHTHALMOLOGY | Facility: CLINIC | Age: 78
End: 2023-12-20
Payer: MEDICARE

## 2023-12-20 ENCOUNTER — NON-APPOINTMENT (OUTPATIENT)
Age: 78
End: 2023-12-20

## 2023-12-20 PROCEDURE — 92012 INTRM OPH EXAM EST PATIENT: CPT

## 2023-12-20 PROCEDURE — 92285 EXTERNAL OCULAR PHOTOGRAPHY: CPT

## 2024-01-02 ENCOUNTER — NON-APPOINTMENT (OUTPATIENT)
Age: 79
End: 2024-01-02

## 2024-01-02 ENCOUNTER — APPOINTMENT (OUTPATIENT)
Dept: OPHTHALMOLOGY | Facility: CLINIC | Age: 79
End: 2024-01-02
Payer: MEDICARE

## 2024-01-02 PROCEDURE — 92132 CPTRZD OPH DX IMG ANT SGM: CPT

## 2024-01-02 PROCEDURE — 92285 EXTERNAL OCULAR PHOTOGRAPHY: CPT

## 2024-01-02 PROCEDURE — 92012 INTRM OPH EXAM EST PATIENT: CPT

## 2024-01-09 ENCOUNTER — NON-APPOINTMENT (OUTPATIENT)
Age: 79
End: 2024-01-09

## 2024-01-09 ENCOUNTER — APPOINTMENT (OUTPATIENT)
Dept: OPHTHALMOLOGY | Facility: CLINIC | Age: 79
End: 2024-01-09
Payer: MEDICARE

## 2024-01-09 PROCEDURE — 92012 INTRM OPH EXAM EST PATIENT: CPT

## 2024-01-09 PROCEDURE — 92071 CONTACT LENS FITTING FOR TX: CPT | Mod: LT

## 2024-01-16 ENCOUNTER — APPOINTMENT (OUTPATIENT)
Dept: OPHTHALMOLOGY | Facility: CLINIC | Age: 79
End: 2024-01-16
Payer: MEDICARE

## 2024-01-16 ENCOUNTER — NON-APPOINTMENT (OUTPATIENT)
Age: 79
End: 2024-01-16

## 2024-01-16 PROCEDURE — 92012 INTRM OPH EXAM EST PATIENT: CPT

## 2024-01-23 ENCOUNTER — NON-APPOINTMENT (OUTPATIENT)
Age: 79
End: 2024-01-23

## 2024-01-23 ENCOUNTER — APPOINTMENT (OUTPATIENT)
Dept: OPHTHALMOLOGY | Facility: CLINIC | Age: 79
End: 2024-01-23
Payer: MEDICARE

## 2024-01-23 PROCEDURE — 92012 INTRM OPH EXAM EST PATIENT: CPT

## 2024-01-23 PROCEDURE — 92071 CONTACT LENS FITTING FOR TX: CPT | Mod: LT

## 2024-01-24 ENCOUNTER — APPOINTMENT (OUTPATIENT)
Dept: OPHTHALMOLOGY | Facility: CLINIC | Age: 79
End: 2024-01-24
Payer: MEDICARE

## 2024-01-24 ENCOUNTER — NON-APPOINTMENT (OUTPATIENT)
Age: 79
End: 2024-01-24

## 2024-01-24 PROCEDURE — 92012 INTRM OPH EXAM EST PATIENT: CPT

## 2024-01-24 PROCEDURE — 92134 CPTRZ OPH DX IMG PST SGM RTA: CPT

## 2024-01-25 ENCOUNTER — APPOINTMENT (OUTPATIENT)
Dept: OPHTHALMOLOGY | Facility: AMBULATORY SURGERY CENTER | Age: 79
End: 2024-01-25

## 2024-01-25 ENCOUNTER — APPOINTMENT (OUTPATIENT)
Dept: OPHTHALMOLOGY | Facility: CLINIC | Age: 79
End: 2024-01-25
Payer: MEDICARE

## 2024-01-25 ENCOUNTER — NON-APPOINTMENT (OUTPATIENT)
Age: 79
End: 2024-01-25

## 2024-01-25 ENCOUNTER — OUTPATIENT (OUTPATIENT)
Dept: OUTPATIENT SERVICES | Facility: HOSPITAL | Age: 79
LOS: 1 days | Discharge: HOME | End: 2024-01-25
Payer: MEDICARE

## 2024-01-25 ENCOUNTER — RESULT REVIEW (OUTPATIENT)
Age: 79
End: 2024-01-25

## 2024-01-25 VITALS
DIASTOLIC BLOOD PRESSURE: 66 MMHG | RESPIRATION RATE: 16 BRPM | OXYGEN SATURATION: 96 % | WEIGHT: 83.78 LBS | TEMPERATURE: 100 F | SYSTOLIC BLOOD PRESSURE: 106 MMHG | HEART RATE: 61 BPM | HEIGHT: 64 IN

## 2024-01-25 VITALS
RESPIRATION RATE: 16 BRPM | OXYGEN SATURATION: 97 % | DIASTOLIC BLOOD PRESSURE: 61 MMHG | TEMPERATURE: 98 F | SYSTOLIC BLOOD PRESSURE: 120 MMHG | HEART RATE: 60 BPM

## 2024-01-25 DIAGNOSIS — T86.8412 CORNEAL TRANSPLANT FAILURE, LEFT EYE: Chronic | ICD-10-CM

## 2024-01-25 DIAGNOSIS — S62.102A FRACTURE OF UNSPECIFIED CARPAL BONE, LEFT WRIST, INITIAL ENCOUNTER FOR CLOSED FRACTURE: Chronic | ICD-10-CM

## 2024-01-25 DIAGNOSIS — Z98.890 OTHER SPECIFIED POSTPROCEDURAL STATES: Chronic | ICD-10-CM

## 2024-01-25 DIAGNOSIS — R91.8 OTHER NONSPECIFIC ABNORMAL FINDING OF LUNG FIELD: Chronic | ICD-10-CM

## 2024-01-25 LAB
GRAM STN FLD: SIGNIFICANT CHANGE UP
SPECIMEN SOURCE: SIGNIFICANT CHANGE UP

## 2024-01-25 PROCEDURE — 65750 CORNEAL TRANSPLANT: CPT | Mod: 80,LT

## 2024-01-25 PROCEDURE — 65750 CORNEAL TRANSPLANT: CPT | Mod: LT

## 2024-01-25 PROCEDURE — 67121 REMOVE EYE IMPLANT MATERIAL: CPT | Mod: LT

## 2024-01-25 PROCEDURE — 88304 TISSUE EXAM BY PATHOLOGIST: CPT | Mod: 26

## 2024-01-25 PROCEDURE — 92012 INTRM OPH EXAM EST PATIENT: CPT

## 2024-01-25 DEVICE — RETINAL  ADATOSIL OIL 10CC: Type: IMPLANTABLE DEVICE | Site: LEFT | Status: FUNCTIONAL

## 2024-01-25 RX ORDER — ACETAMINOPHEN 500 MG
650 TABLET ORAL EVERY 6 HOURS
Refills: 0 | Status: DISCONTINUED | OUTPATIENT
Start: 2024-01-25 | End: 2024-01-25

## 2024-01-25 RX ORDER — OFLOXACIN 0.3 %
1 DROPS OPHTHALMIC (EYE)
Refills: 0 | Status: COMPLETED | OUTPATIENT
Start: 2024-01-25 | End: 2024-01-25

## 2024-01-25 RX ORDER — SODIUM CHLORIDE 9 MG/ML
1000 INJECTION, SOLUTION INTRAVENOUS
Refills: 0 | Status: DISCONTINUED | OUTPATIENT
Start: 2024-01-25 | End: 2024-01-25

## 2024-01-25 RX ADMIN — Medication 1 DROP(S): at 13:40

## 2024-01-25 RX ADMIN — SODIUM CHLORIDE 40 MILLILITER(S): 9 INJECTION, SOLUTION INTRAVENOUS at 17:45

## 2024-01-25 RX ADMIN — Medication 1 DROP(S): at 13:35

## 2024-01-25 RX ADMIN — Medication 1 DROP(S): at 13:45

## 2024-01-25 NOTE — ASU PATIENT PROFILE, ADULT - FALL HARM RISK - UNIVERSAL INTERVENTIONS
Bed in lowest position, wheels locked, appropriate side rails in place/Call bell, personal items and telephone in reach/Instruct patient to call for assistance before getting out of bed or chair/Non-slip footwear when patient is out of bed/[place] to call system/Physically safe environment - no spills, clutter or unnecessary equipment/Purposeful Proactive Rounding/Room/bathroom lighting operational, light cord in reach

## 2024-01-25 NOTE — PRE-ANESTHESIA EVALUATION ADULT - NSATTENDATTESTRD_GEN_ALL_CORE
The patient has been re-examined and I agree with the above assessment or I updated with my findings. 59-year-old female who was recently diagnosed with pancreatic carcinoma, and recommended by her hematologist placement of a MediPort for chemotherapy. Pt presents for procedure Thursday 7/1/21   She denies abd pain, fevers, chills, urgency, constipation

## 2024-01-25 NOTE — OPERATIVE REPORT - OPERATIVE RPOSRT DETAILS
SURGEON: Bao Juárez MD    ASSISTANT: Martha Pretty MD & Falguni Alfred MD    PREOPERATIVE DIAGNOSIS:  perforated corneal ulcer, Left eye    POSTOPERATIVE DIAGNOSIS:  Same, Left eye    OPERATIVE PROCEDURE:  Penetrating Keratoplasty, silicone oil removal, silicone oil insertion, Left eye    IMPLANT: Donor #  [id number]                                Graft size:  8.75 mm              Host trephination size: 8.5mm    PROCEDURE:	  The patient was brought into the operating room and placed supine on the operating room table. After uneventful induction of neuroleptic anesthesia, a retrobulbar block consisting of 5-7 cc of 2% lidocaine and 0.75% marcaine was administered around the left eye.  A modified van Lint style lid block was also given.  The patient was prepped and draped in the usual sterile fashion. A wire lid speculum was inserted into the operative eye giving a wide palpebral fissure.  This procedure is going to be performed with an assistant surgeon whose extra hands are required to properly manage the surgery.   A bandage contact lens which had been placed prior to surgery was removed. Using Castroviejo calipers the graft size was determined that would adequately excise pathologic corneal tissue avoiding limbal blood vessels. Attention was then addressed to the donor cornea which was inspected and found to be suitable for use. The donor was placed endothelial size up on a suction punch block. A new disposable trephine was then used to fashion a donor corneal button of appropriate size. This was covered with corneal storage media and set aside for later use.   Attention was then readdressed to the recipient. All pre-existing nylon corneal sutures were removed. The perforated cornea was dissected off using a sinksy hook followed by a karely knife. Intraocular silicone oil was irrigated out of the eye using BSS. Viscoelastic was then used to coat the lens iris diaphragm. The previously fashioned corneal donor button was then placed endothelial side down in the recipient bed. It was secured into position with 19 interrupted 10-0 nylon sutures. These sutures were knotted securely and the knots buried. The anterior chamber was maintained throughout the procedure using balanced salt solution. Silicone oil was infused back into the eye using a space between two sutures until the eye attained an adequate fill. 0.1cc of vorizonazole was injected into the anterior chamber. Separate conjunctival irrigations consisting of 4 mg dexamethasone and 100 mg cefazolin were administered topically. The lid speculum was removed from the eye and a shield and dressing placed over the eye.  The patient tolerated the procedure well and went to the recovery area in good condition.

## 2024-01-26 ENCOUNTER — APPOINTMENT (OUTPATIENT)
Dept: OPHTHALMOLOGY | Facility: CLINIC | Age: 79
End: 2024-01-26

## 2024-01-26 ENCOUNTER — NON-APPOINTMENT (OUTPATIENT)
Age: 79
End: 2024-01-26

## 2024-01-26 ENCOUNTER — APPOINTMENT (OUTPATIENT)
Dept: OPHTHALMOLOGY | Facility: CLINIC | Age: 79
End: 2024-01-26
Payer: MEDICARE

## 2024-01-26 PROCEDURE — 99024 POSTOP FOLLOW-UP VISIT: CPT

## 2024-01-31 LAB — SURGICAL PATHOLOGY STUDY: SIGNIFICANT CHANGE UP

## 2024-02-02 ENCOUNTER — APPOINTMENT (OUTPATIENT)
Dept: OPHTHALMOLOGY | Facility: CLINIC | Age: 79
End: 2024-02-02
Payer: MEDICARE

## 2024-02-02 ENCOUNTER — NON-APPOINTMENT (OUTPATIENT)
Age: 79
End: 2024-02-02

## 2024-02-02 PROCEDURE — 92285 EXTERNAL OCULAR PHOTOGRAPHY: CPT

## 2024-02-02 PROCEDURE — 99024 POSTOP FOLLOW-UP VISIT: CPT

## 2024-02-07 ENCOUNTER — APPOINTMENT (OUTPATIENT)
Dept: OPHTHALMOLOGY | Facility: CLINIC | Age: 79
End: 2024-02-07
Payer: MEDICARE

## 2024-02-07 ENCOUNTER — NON-APPOINTMENT (OUTPATIENT)
Age: 79
End: 2024-02-07

## 2024-02-07 PROCEDURE — 99024 POSTOP FOLLOW-UP VISIT: CPT

## 2024-02-07 PROCEDURE — 65222 REMOVE FOREIGN BODY FROM EYE: CPT | Mod: 79,LT

## 2024-02-07 PROCEDURE — 92012 INTRM OPH EXAM EST PATIENT: CPT | Mod: 24,25

## 2024-02-14 ENCOUNTER — NON-APPOINTMENT (OUTPATIENT)
Age: 79
End: 2024-02-14

## 2024-02-14 ENCOUNTER — APPOINTMENT (OUTPATIENT)
Dept: OPHTHALMOLOGY | Facility: CLINIC | Age: 79
End: 2024-02-14
Payer: MEDICARE

## 2024-02-14 PROCEDURE — 92285 EXTERNAL OCULAR PHOTOGRAPHY: CPT

## 2024-02-14 PROCEDURE — 92071 CONTACT LENS FITTING FOR TX: CPT | Mod: LT

## 2024-02-14 PROCEDURE — 99024 POSTOP FOLLOW-UP VISIT: CPT

## 2024-02-15 ENCOUNTER — NON-APPOINTMENT (OUTPATIENT)
Age: 79
End: 2024-02-15

## 2024-02-15 ENCOUNTER — APPOINTMENT (OUTPATIENT)
Dept: OPHTHALMOLOGY | Facility: CLINIC | Age: 79
End: 2024-02-15
Payer: MEDICARE

## 2024-02-15 LAB
CULTURE RESULTS: NO GROWTH — SIGNIFICANT CHANGE UP
SPECIMEN SOURCE: SIGNIFICANT CHANGE UP

## 2024-02-15 PROCEDURE — 99024 POSTOP FOLLOW-UP VISIT: CPT

## 2024-02-15 PROCEDURE — 92071 CONTACT LENS FITTING FOR TX: CPT | Mod: LT

## 2024-02-20 ENCOUNTER — NON-APPOINTMENT (OUTPATIENT)
Age: 79
End: 2024-02-20

## 2024-02-20 ENCOUNTER — APPOINTMENT (OUTPATIENT)
Dept: OPHTHALMOLOGY | Facility: CLINIC | Age: 79
End: 2024-02-20
Payer: MEDICARE

## 2024-02-20 PROCEDURE — 66020 INJECTION TREATMENT OF EYE: CPT | Mod: 78,LT

## 2024-02-20 PROCEDURE — 99024 POSTOP FOLLOW-UP VISIT: CPT

## 2024-02-23 ENCOUNTER — NON-APPOINTMENT (OUTPATIENT)
Age: 79
End: 2024-02-23

## 2024-02-23 ENCOUNTER — APPOINTMENT (OUTPATIENT)
Dept: OPHTHALMOLOGY | Facility: CLINIC | Age: 79
End: 2024-02-23
Payer: MEDICARE

## 2024-02-23 PROCEDURE — 66020 INJECTION TREATMENT OF EYE: CPT | Mod: 78,LT

## 2024-02-23 PROCEDURE — 99024 POSTOP FOLLOW-UP VISIT: CPT

## 2024-02-28 ENCOUNTER — APPOINTMENT (OUTPATIENT)
Dept: OPHTHALMOLOGY | Facility: CLINIC | Age: 79
End: 2024-02-28
Payer: MEDICARE

## 2024-02-28 ENCOUNTER — NON-APPOINTMENT (OUTPATIENT)
Age: 79
End: 2024-02-28

## 2024-02-28 PROCEDURE — 99024 POSTOP FOLLOW-UP VISIT: CPT

## 2024-02-28 PROCEDURE — 92012 INTRM OPH EXAM EST PATIENT: CPT | Mod: 24

## 2024-03-06 ENCOUNTER — APPOINTMENT (OUTPATIENT)
Dept: OPHTHALMOLOGY | Facility: CLINIC | Age: 79
End: 2024-03-06
Payer: MEDICARE

## 2024-03-06 ENCOUNTER — NON-APPOINTMENT (OUTPATIENT)
Age: 79
End: 2024-03-06

## 2024-03-06 PROCEDURE — 66020 INJECTION TREATMENT OF EYE: CPT | Mod: 78,LT

## 2024-03-06 PROCEDURE — 99024 POSTOP FOLLOW-UP VISIT: CPT

## 2024-03-12 ENCOUNTER — APPOINTMENT (OUTPATIENT)
Dept: OPHTHALMOLOGY | Facility: CLINIC | Age: 79
End: 2024-03-12
Payer: MEDICARE

## 2024-03-12 ENCOUNTER — NON-APPOINTMENT (OUTPATIENT)
Age: 79
End: 2024-03-12

## 2024-03-12 PROCEDURE — 92012 INTRM OPH EXAM EST PATIENT: CPT | Mod: 24

## 2024-03-19 ENCOUNTER — APPOINTMENT (OUTPATIENT)
Dept: OPHTHALMOLOGY | Facility: CLINIC | Age: 79
End: 2024-03-19
Payer: MEDICARE

## 2024-03-19 ENCOUNTER — NON-APPOINTMENT (OUTPATIENT)
Age: 79
End: 2024-03-19

## 2024-03-19 PROCEDURE — 65430 CORNEAL SMEAR: CPT | Mod: 78,LT

## 2024-03-19 PROCEDURE — 99024 POSTOP FOLLOW-UP VISIT: CPT

## 2024-03-22 ENCOUNTER — APPOINTMENT (OUTPATIENT)
Dept: OPHTHALMOLOGY | Facility: CLINIC | Age: 79
End: 2024-03-22
Payer: MEDICARE

## 2024-03-22 ENCOUNTER — NON-APPOINTMENT (OUTPATIENT)
Age: 79
End: 2024-03-22

## 2024-03-22 PROCEDURE — 66020 INJECTION TREATMENT OF EYE: CPT | Mod: LT

## 2024-03-22 PROCEDURE — 92012 INTRM OPH EXAM EST PATIENT: CPT | Mod: 25

## 2024-03-26 ENCOUNTER — APPOINTMENT (OUTPATIENT)
Dept: OPHTHALMOLOGY | Facility: CLINIC | Age: 79
End: 2024-03-26
Payer: MEDICARE

## 2024-03-26 ENCOUNTER — NON-APPOINTMENT (OUTPATIENT)
Age: 79
End: 2024-03-26

## 2024-03-26 PROCEDURE — 99024 POSTOP FOLLOW-UP VISIT: CPT

## 2024-03-26 PROCEDURE — 66020 INJECTION TREATMENT OF EYE: CPT | Mod: 78,LT

## 2024-03-29 ENCOUNTER — APPOINTMENT (OUTPATIENT)
Dept: OPHTHALMOLOGY | Facility: CLINIC | Age: 79
End: 2024-03-29
Payer: MEDICARE

## 2024-03-29 ENCOUNTER — NON-APPOINTMENT (OUTPATIENT)
Age: 79
End: 2024-03-29

## 2024-03-29 PROCEDURE — 66020 INJECTION TREATMENT OF EYE: CPT | Mod: 78,LT

## 2024-03-29 PROCEDURE — 92250 FUNDUS PHOTOGRAPHY W/I&R: CPT

## 2024-03-29 PROCEDURE — 92012 INTRM OPH EXAM EST PATIENT: CPT | Mod: 24,25

## 2024-04-02 ENCOUNTER — APPOINTMENT (OUTPATIENT)
Dept: OPHTHALMOLOGY | Facility: CLINIC | Age: 79
End: 2024-04-02
Payer: MEDICARE

## 2024-04-02 ENCOUNTER — NON-APPOINTMENT (OUTPATIENT)
Age: 79
End: 2024-04-02

## 2024-04-02 PROCEDURE — 66020 INJECTION TREATMENT OF EYE: CPT | Mod: 78,LT

## 2024-04-02 PROCEDURE — 99024 POSTOP FOLLOW-UP VISIT: CPT

## 2024-04-05 ENCOUNTER — NON-APPOINTMENT (OUTPATIENT)
Age: 79
End: 2024-04-05

## 2024-04-05 ENCOUNTER — APPOINTMENT (OUTPATIENT)
Dept: OPHTHALMOLOGY | Facility: CLINIC | Age: 79
End: 2024-04-05
Payer: MEDICARE

## 2024-04-05 PROCEDURE — 66020 INJECTION TREATMENT OF EYE: CPT | Mod: 78,LT

## 2024-04-05 PROCEDURE — 92285 EXTERNAL OCULAR PHOTOGRAPHY: CPT

## 2024-04-05 PROCEDURE — 99024 POSTOP FOLLOW-UP VISIT: CPT

## 2024-04-10 ENCOUNTER — NON-APPOINTMENT (OUTPATIENT)
Age: 79
End: 2024-04-10

## 2024-04-10 ENCOUNTER — APPOINTMENT (OUTPATIENT)
Dept: OPHTHALMOLOGY | Facility: CLINIC | Age: 79
End: 2024-04-10
Payer: MEDICARE

## 2024-04-10 ENCOUNTER — APPOINTMENT (OUTPATIENT)
Dept: OPHTHALMOLOGY | Facility: CLINIC | Age: 79
End: 2024-04-10

## 2024-04-10 PROCEDURE — 99024 POSTOP FOLLOW-UP VISIT: CPT

## 2024-04-10 PROCEDURE — 92134 CPTRZ OPH DX IMG PST SGM RTA: CPT

## 2024-04-10 PROCEDURE — 92012 INTRM OPH EXAM EST PATIENT: CPT | Mod: 24

## 2024-04-12 ENCOUNTER — NON-APPOINTMENT (OUTPATIENT)
Age: 79
End: 2024-04-12

## 2024-04-12 ENCOUNTER — APPOINTMENT (OUTPATIENT)
Dept: OPHTHALMOLOGY | Facility: CLINIC | Age: 79
End: 2024-04-12
Payer: MEDICARE

## 2024-04-12 PROCEDURE — 66020 INJECTION TREATMENT OF EYE: CPT | Mod: 78,LT

## 2024-04-12 PROCEDURE — 92012 INTRM OPH EXAM EST PATIENT: CPT | Mod: 25,24

## 2024-04-16 ENCOUNTER — APPOINTMENT (OUTPATIENT)
Dept: OPHTHALMOLOGY | Facility: CLINIC | Age: 79
End: 2024-04-16
Payer: MEDICARE

## 2024-04-16 ENCOUNTER — NON-APPOINTMENT (OUTPATIENT)
Age: 79
End: 2024-04-16

## 2024-04-16 PROCEDURE — 92012 INTRM OPH EXAM EST PATIENT: CPT | Mod: 24

## 2024-04-19 ENCOUNTER — APPOINTMENT (OUTPATIENT)
Dept: OPHTHALMOLOGY | Facility: CLINIC | Age: 79
End: 2024-04-19
Payer: MEDICARE

## 2024-04-19 ENCOUNTER — NON-APPOINTMENT (OUTPATIENT)
Age: 79
End: 2024-04-19

## 2024-04-19 PROCEDURE — 66020 INJECTION TREATMENT OF EYE: CPT | Mod: 78,LT

## 2024-04-19 PROCEDURE — 99024 POSTOP FOLLOW-UP VISIT: CPT

## 2024-04-23 ENCOUNTER — APPOINTMENT (OUTPATIENT)
Dept: OPHTHALMOLOGY | Facility: CLINIC | Age: 79
End: 2024-04-23
Payer: MEDICARE

## 2024-04-23 ENCOUNTER — NON-APPOINTMENT (OUTPATIENT)
Age: 79
End: 2024-04-23

## 2024-04-23 PROCEDURE — 99024 POSTOP FOLLOW-UP VISIT: CPT

## 2024-04-23 PROCEDURE — 66020 INJECTION TREATMENT OF EYE: CPT | Mod: 78,LT

## 2024-04-26 ENCOUNTER — APPOINTMENT (OUTPATIENT)
Dept: OPHTHALMOLOGY | Facility: CLINIC | Age: 79
End: 2024-04-26

## 2024-04-26 ENCOUNTER — NON-APPOINTMENT (OUTPATIENT)
Age: 79
End: 2024-04-26

## 2024-04-30 ENCOUNTER — APPOINTMENT (OUTPATIENT)
Dept: OPHTHALMOLOGY | Facility: CLINIC | Age: 79
End: 2024-04-30
Payer: MEDICARE

## 2024-04-30 ENCOUNTER — NON-APPOINTMENT (OUTPATIENT)
Age: 79
End: 2024-04-30

## 2024-04-30 PROCEDURE — 92012 INTRM OPH EXAM EST PATIENT: CPT | Mod: 25

## 2024-04-30 PROCEDURE — 66020 INJECTION TREATMENT OF EYE: CPT | Mod: LT

## 2024-05-03 ENCOUNTER — NON-APPOINTMENT (OUTPATIENT)
Age: 79
End: 2024-05-03

## 2024-05-03 ENCOUNTER — APPOINTMENT (OUTPATIENT)
Dept: OPHTHALMOLOGY | Facility: CLINIC | Age: 79
End: 2024-05-03
Payer: MEDICARE

## 2024-05-03 PROCEDURE — 66020 INJECTION TREATMENT OF EYE: CPT | Mod: 78,LT

## 2024-05-03 PROCEDURE — 99024 POSTOP FOLLOW-UP VISIT: CPT

## 2024-05-07 RX ORDER — SODIUM CHLORIDE 9 MG/ML
1000 INJECTION, SOLUTION INTRAVENOUS
Refills: 0 | Status: DISCONTINUED | OUTPATIENT
Start: 2024-05-09 | End: 2024-05-09

## 2024-05-08 ENCOUNTER — TRANSCRIPTION ENCOUNTER (OUTPATIENT)
Age: 79
End: 2024-05-08

## 2024-05-08 NOTE — ASU PATIENT PROFILE, ADULT - NS PREOP UNDERSTANDS INFO
No solid food for 8 hours before surgery/ no chewing gums or hard candy while NPO/ wear loose comfortable fitting clothes/yes

## 2024-05-08 NOTE — ASU PATIENT PROFILE, ADULT - FALL HARM RISK - UNIVERSAL INTERVENTIONS
Bed in lowest position, wheels locked, appropriate side rails in place/Call bell, personal items and telephone in reach/Instruct patient to call for assistance before getting out of bed or chair/Non-slip footwear when patient is out of bed/[place] to call system/Physically safe environment - no spills, clutter or unnecessary equipment/Purposeful Proactive Rounding/Room/bathroom lighting operational, light cord in reach

## 2024-05-09 ENCOUNTER — OUTPATIENT (OUTPATIENT)
Dept: OUTPATIENT SERVICES | Facility: HOSPITAL | Age: 79
LOS: 1 days | Discharge: HOME | End: 2024-05-09
Payer: MEDICARE

## 2024-05-09 ENCOUNTER — RESULT REVIEW (OUTPATIENT)
Age: 79
End: 2024-05-09

## 2024-05-09 ENCOUNTER — APPOINTMENT (OUTPATIENT)
Dept: OPHTHALMOLOGY | Facility: AMBULATORY SURGERY CENTER | Age: 79
End: 2024-05-09

## 2024-05-09 ENCOUNTER — TRANSCRIPTION ENCOUNTER (OUTPATIENT)
Age: 79
End: 2024-05-09

## 2024-05-09 VITALS
WEIGHT: 85.98 LBS | DIASTOLIC BLOOD PRESSURE: 48 MMHG | RESPIRATION RATE: 16 BRPM | HEART RATE: 56 BPM | TEMPERATURE: 100 F | SYSTOLIC BLOOD PRESSURE: 115 MMHG | OXYGEN SATURATION: 98 % | HEIGHT: 64 IN

## 2024-05-09 VITALS
TEMPERATURE: 98 F | OXYGEN SATURATION: 97 % | DIASTOLIC BLOOD PRESSURE: 67 MMHG | SYSTOLIC BLOOD PRESSURE: 97 MMHG | RESPIRATION RATE: 14 BRPM | HEART RATE: 61 BPM

## 2024-05-09 DIAGNOSIS — S62.102A FRACTURE OF UNSPECIFIED CARPAL BONE, LEFT WRIST, INITIAL ENCOUNTER FOR CLOSED FRACTURE: Chronic | ICD-10-CM

## 2024-05-09 DIAGNOSIS — R91.8 OTHER NONSPECIFIC ABNORMAL FINDING OF LUNG FIELD: Chronic | ICD-10-CM

## 2024-05-09 DIAGNOSIS — Z98.890 OTHER SPECIFIED POSTPROCEDURAL STATES: Chronic | ICD-10-CM

## 2024-05-09 DIAGNOSIS — T86.8412 CORNEAL TRANSPLANT FAILURE, LEFT EYE: Chronic | ICD-10-CM

## 2024-05-09 PROCEDURE — 67036 REMOVAL OF INNER EYE FLUID: CPT | Mod: 78,LT

## 2024-05-09 PROCEDURE — 65750 CORNEAL TRANSPLANT: CPT | Mod: 79,LT

## 2024-05-09 PROCEDURE — 88304 TISSUE EXAM BY PATHOLOGIST: CPT | Mod: 26

## 2024-05-09 PROCEDURE — 67028 INJECTION EYE DRUG: CPT | Mod: 79,LT

## 2024-05-09 DEVICE — RETINAL  ADATOSIL OIL 10CC: Type: IMPLANTABLE DEVICE | Site: LEFT | Status: FUNCTIONAL

## 2024-05-09 DEVICE — IMPLANTABLE DEVICE: Type: IMPLANTABLE DEVICE | Site: LEFT | Status: FUNCTIONAL

## 2024-05-09 RX ORDER — OFLOXACIN 0.3 %
1 DROPS OPHTHALMIC (EYE)
Refills: 0 | Status: COMPLETED | OUTPATIENT
Start: 2024-05-09 | End: 2024-05-09

## 2024-05-09 RX ORDER — VORICONAZOLE 10 MG/ML
0.1 INJECTION, POWDER, LYOPHILIZED, FOR SOLUTION INTRAVENOUS ONCE
Refills: 0 | Status: DISCONTINUED | OUTPATIENT
Start: 2024-05-09 | End: 2024-05-09

## 2024-05-09 RX ORDER — ACETAMINOPHEN 500 MG
650 TABLET ORAL ONCE
Refills: 0 | Status: DISCONTINUED | OUTPATIENT
Start: 2024-05-09 | End: 2024-05-09

## 2024-05-09 RX ADMIN — Medication 1 DROP(S): at 13:05

## 2024-05-09 RX ADMIN — Medication 1 DROP(S): at 13:15

## 2024-05-09 RX ADMIN — Medication 1 DROP(S): at 13:10

## 2024-05-09 NOTE — ASU PREOP CHECKLIST - 2.
Temp 37.5 taken twice with and without warmer on.   Dr Veronica informed.  [place] Nurse manager informed.   [place] Nurse Manager also informed Temp 37.5 taken twice with and without warmer on.   Dr Veronica informed. Patient no coughing, assymptomatic.   [place] Nurse manager informed.   [place] Nurse Manager also informed

## 2024-05-09 NOTE — ASU DISCHARGE PLAN (ADULT/PEDIATRIC) - PROCEDURE
LEFT EYE PENETRATING KERATOPLASTY AND PARS PLANA VITRECTOMY WITH SILICONE OIL AND  INJECTION OF INTRAVITREAL VORICONAZOLE.

## 2024-05-09 NOTE — OPERATIVE REPORT - OPERATIVE RPOSRT DETAILS
SURGEON: Bao Juárez MD and Forrest Fung MD    ASSISTANT: Bushra Lugo MD and Gricel Byers MD    PREOPERATIVE DIAGNOSIS:  Corneal scar, Chronic fungal endophthalmitis, Left eye    POSTOPERATIVE DIAGNOSIS:  Same- Left eye    OPERATIVE PROCEDURE:  Penetrating Keratoplasty, Intraocular injection of voriconazole, Pars plana vitrectomy, Left eye    IMPLANT: Donor #                                  Graft size:                 Host trephination size:    PROCEDURE:	  The patient was brought into the operating room and placed supine on the operating room table. After uneventful induction of neuroleptic anesthesia, a retrobulbar block consisting of 5-7  cc of 2% lidocaine and 0.75% marcaine was administered around the left eye. A modified van Lint style lid block was also given.  The patient was prepped and draped in the usual sterile fashion. A wire lid speculum was inserted into the operative eye giving a wide palpebral fissure.   Using Castroviejo calipers the graft size was determined that would adequately excise pathologic corneal tissue avoiding limbal blood vessels. A Fleuringa ring was centered around the corneoscleral limbus and secured to superficial sclera with four interrupted 8-0 vicryl sutures. Attention was then addressed to the donor cornea which was inspected and found to be suitable for use. The donor was placed endothelial size up on a suction punch block. A new disposable trephine was then used to fashion a donor corneal button of appropriate size. This was covered with corneal storage media and set aside for later use.   Attention was then readdressed to the recipient. A new suction trephine was then centered over the cornea. Suction was attained and cut down continued until the anterior chamber was entered. The corneal button was then completely excised using a kaerly knife. If needed synechiolysis of any cornea iris adhesions was performed. Viscoelastic was then used to coat the lens iris diaphragm. The previously fashioned corneal donor button was then placed endothelial side down in the recipient bed. It was secured into position with 16 interrupted 10-0 nylon sutures. These sutures were knotted securely and the knots buried. The anterior chamber was maintained throughout the procedure using balanced salt solution. The Fleuringa ring was then removed from the eye. Separate conjunctival irrigations consisting of 4 mg dexamethasone and 100 mg cefazolin were administered inferiorly. The lid speculum was removed from the eye and a shield and dressing placed over the eye.  The patient tolerated the procedure well and went to the recovery area in good condition.     SURGEON: Bao Juárez MD and Forrest Fung MD    ASSISTANT: Bushra Lugo MD and Gricel Byers MD    PREOPERATIVE DIAGNOSIS:  Corneal scar, Chronic fungal endophthalmitis, Left eye    POSTOPERATIVE DIAGNOSIS:  Same- Left eye    OPERATIVE PROCEDURE:  Penetrating Keratoplasty, Intraocular injection of voriconazole, Pars plana vitrectomy, Left eye    IMPLANT: Donor #   [id number]         Graft size: 9.00 mm         Host trephination size:  9.00 mm    PROCEDURE:	  The patient was brought into the operating room and placed supine on the operating room table. After uneventful induction of neuroleptic anesthesia, a retrobulbar block consisting of 5-7  cc of 2% lidocaine and 0.75% marcaine was administered around the left eye. A modified van Lint style lid block was also given.  The patient was prepped and draped in the usual sterile fashion. A wire lid speculum was inserted into the operative eye giving a wide palpebral fissure.   Using Castroviejo calipers the graft size was determined that would adequately excise pathologic corneal tissue avoiding limbal blood vessels. A Fleuringa ring was centered around the corneoscleral limbus and secured to superficial sclera with four interrupted 8-0 vicryl sutures. Attention was then addressed to the donor cornea which was inspected and found to be suitable for use. The donor was placed endothelial size up on a suction punch block. A new disposable trephine was then used to fashion a donor corneal button of appropriate size. This was covered with corneal storage media and set aside for later use.   Attention was then readdressed to the recipient. A new suction trephine was then centered over the cornea. Suction was attained and cut down continued until the anterior chamber was entered. The corneal button was then completely excised using a karely knife. If needed synechiolysis of any cornea iris adhesions was performed. Viscoelastic was then used to coat the lens iris diaphragm. The previously fashioned corneal donor button was then placed endothelial side down in the recipient bed. It was secured into position with 16 interrupted 10-0 nylon sutures. These sutures were knotted securely and the knots buried. The anterior chamber was maintained throughout the procedure using balanced salt solution. The Fleuringa ring was removed from the eye.  A pars plana vitrectomy and silicone oil insertion was performed by Dr. Fung, as detailed in his note. Voriconazole 100 mcg/0.1 mL was injected into the anterior chamber. Separate conjunctival irrigations consisting of 4 mg dexamethasone and 100 mg cefazolin were administered inferiorly. The lid speculum was removed from the eye and a shield and dressing placed over the eye.  The patient tolerated the procedure well and went to the recovery area in good condition.

## 2024-05-09 NOTE — PRE-ANESTHESIA EVALUATION ADULT - NSPROPOSEDPROCEDFT_GEN_ALL_CORE
Penetrating keratoplasty with injection of intravitreal voriconazole and pars plana vitrectomy of the left eye

## 2024-05-09 NOTE — ASU PREOP CHECKLIST - INTERNAL PROSTHESES
right hip screw from previous surgery/yes(specify) Infliximab Counseling:  I discussed with the patient the risks of infliximab including but not limited to myelosuppression, immunosuppression, autoimmune hepatitis, demyelinating diseases, lymphoma, and serious infections.  The patient understands that monitoring is required including a PPD at baseline and must alert us or the primary physician if symptoms of infection or other concerning signs are noted.

## 2024-05-09 NOTE — PRE-ANESTHESIA EVALUATION ADULT - NSANTHADDINFOFT_GEN_ALL_CORE
Discussed the primary plan for MAC sedation including associated risks, benefits, and alternatives. All questions were answered.    Further discussed the possibility of conversion to general anesthesia for patient safety as well as associated risks - including the risk of cardiopulmonary compromise, eye injury, sore throat, airway injury, postoperative nausea and vomiting, and nerve injury. All questions were answered and the patient elects to proceed.

## 2024-05-10 ENCOUNTER — APPOINTMENT (OUTPATIENT)
Dept: OPHTHALMOLOGY | Facility: CLINIC | Age: 79
End: 2024-05-10
Payer: MEDICARE

## 2024-05-10 ENCOUNTER — NON-APPOINTMENT (OUTPATIENT)
Age: 79
End: 2024-05-10

## 2024-05-10 LAB
GRAM STN FLD: SIGNIFICANT CHANGE UP
SPECIMEN SOURCE: SIGNIFICANT CHANGE UP

## 2024-05-10 PROCEDURE — 92012 INTRM OPH EXAM EST PATIENT: CPT | Mod: 24

## 2024-05-15 ENCOUNTER — APPOINTMENT (OUTPATIENT)
Dept: OPHTHALMOLOGY | Facility: CLINIC | Age: 79
End: 2024-05-15
Payer: MEDICARE

## 2024-05-15 ENCOUNTER — NON-APPOINTMENT (OUTPATIENT)
Age: 79
End: 2024-05-15

## 2024-05-15 LAB
CULTURE RESULTS: NO GROWTH — SIGNIFICANT CHANGE UP
SPECIMEN SOURCE: SIGNIFICANT CHANGE UP

## 2024-05-15 PROCEDURE — 99024 POSTOP FOLLOW-UP VISIT: CPT

## 2024-05-21 LAB — SURGICAL PATHOLOGY STUDY: SIGNIFICANT CHANGE UP

## 2024-05-28 ENCOUNTER — APPOINTMENT (OUTPATIENT)
Dept: OPHTHALMOLOGY | Facility: CLINIC | Age: 79
End: 2024-05-28
Payer: MEDICARE

## 2024-05-28 ENCOUNTER — NON-APPOINTMENT (OUTPATIENT)
Age: 79
End: 2024-05-28

## 2024-05-28 PROCEDURE — 99024 POSTOP FOLLOW-UP VISIT: CPT

## 2024-06-04 ENCOUNTER — APPOINTMENT (OUTPATIENT)
Dept: OPHTHALMOLOGY | Facility: CLINIC | Age: 79
End: 2024-06-04
Payer: MEDICARE

## 2024-06-04 ENCOUNTER — NON-APPOINTMENT (OUTPATIENT)
Age: 79
End: 2024-06-04

## 2024-06-04 PROCEDURE — 66020 INJECTION TREATMENT OF EYE: CPT | Mod: 78,LT

## 2024-06-04 PROCEDURE — 99024 POSTOP FOLLOW-UP VISIT: CPT

## 2024-06-07 ENCOUNTER — APPOINTMENT (OUTPATIENT)
Dept: OPHTHALMOLOGY | Facility: CLINIC | Age: 79
End: 2024-06-07
Payer: MEDICARE

## 2024-06-07 ENCOUNTER — NON-APPOINTMENT (OUTPATIENT)
Age: 79
End: 2024-06-07

## 2024-06-07 PROCEDURE — 99024 POSTOP FOLLOW-UP VISIT: CPT

## 2024-06-11 ENCOUNTER — NON-APPOINTMENT (OUTPATIENT)
Age: 79
End: 2024-06-11

## 2024-06-11 ENCOUNTER — APPOINTMENT (OUTPATIENT)
Dept: OPHTHALMOLOGY | Facility: CLINIC | Age: 79
End: 2024-06-11
Payer: MEDICARE

## 2024-06-11 PROCEDURE — 99024 POSTOP FOLLOW-UP VISIT: CPT

## 2024-06-14 ENCOUNTER — NON-APPOINTMENT (OUTPATIENT)
Age: 79
End: 2024-06-14

## 2024-06-14 ENCOUNTER — APPOINTMENT (OUTPATIENT)
Dept: OPHTHALMOLOGY | Facility: CLINIC | Age: 79
End: 2024-06-14
Payer: MEDICARE

## 2024-06-14 PROCEDURE — 99024 POSTOP FOLLOW-UP VISIT: CPT

## 2024-06-18 ENCOUNTER — NON-APPOINTMENT (OUTPATIENT)
Age: 79
End: 2024-06-18

## 2024-06-18 ENCOUNTER — APPOINTMENT (OUTPATIENT)
Dept: OPHTHALMOLOGY | Facility: CLINIC | Age: 79
End: 2024-06-18
Payer: MEDICARE

## 2024-06-18 PROCEDURE — 99024 POSTOP FOLLOW-UP VISIT: CPT

## 2024-06-21 ENCOUNTER — NON-APPOINTMENT (OUTPATIENT)
Age: 79
End: 2024-06-21

## 2024-06-21 ENCOUNTER — APPOINTMENT (OUTPATIENT)
Dept: OPHTHALMOLOGY | Facility: CLINIC | Age: 79
End: 2024-06-21
Payer: MEDICARE

## 2024-06-21 PROCEDURE — 99024 POSTOP FOLLOW-UP VISIT: CPT

## 2024-06-25 ENCOUNTER — APPOINTMENT (OUTPATIENT)
Dept: OPHTHALMOLOGY | Facility: CLINIC | Age: 79
End: 2024-06-25
Payer: MEDICARE

## 2024-06-25 ENCOUNTER — NON-APPOINTMENT (OUTPATIENT)
Age: 79
End: 2024-06-25

## 2024-06-25 PROCEDURE — 65778 COVER EYE W/MEMBRANE: CPT | Mod: 78,LT

## 2024-06-25 PROCEDURE — 99024 POSTOP FOLLOW-UP VISIT: CPT

## 2024-06-28 ENCOUNTER — APPOINTMENT (OUTPATIENT)
Dept: OPHTHALMOLOGY | Facility: CLINIC | Age: 79
End: 2024-06-28
Payer: MEDICARE

## 2024-06-28 ENCOUNTER — NON-APPOINTMENT (OUTPATIENT)
Age: 79
End: 2024-06-28

## 2024-06-28 PROCEDURE — 99024 POSTOP FOLLOW-UP VISIT: CPT

## 2024-07-01 ENCOUNTER — NON-APPOINTMENT (OUTPATIENT)
Age: 79
End: 2024-07-01

## 2024-07-01 ENCOUNTER — APPOINTMENT (OUTPATIENT)
Dept: OPHTHALMOLOGY | Facility: CLINIC | Age: 79
End: 2024-07-01
Payer: MEDICARE

## 2024-07-01 PROCEDURE — 99024 POSTOP FOLLOW-UP VISIT: CPT

## 2024-07-03 ENCOUNTER — APPOINTMENT (OUTPATIENT)
Dept: OPHTHALMOLOGY | Facility: CLINIC | Age: 79
End: 2024-07-03
Payer: MEDICARE

## 2024-07-03 ENCOUNTER — NON-APPOINTMENT (OUTPATIENT)
Age: 79
End: 2024-07-03

## 2024-07-03 PROCEDURE — 99024 POSTOP FOLLOW-UP VISIT: CPT

## 2024-07-10 ENCOUNTER — APPOINTMENT (OUTPATIENT)
Dept: OPHTHALMOLOGY | Facility: CLINIC | Age: 79
End: 2024-07-10
Payer: MEDICARE

## 2024-07-10 ENCOUNTER — NON-APPOINTMENT (OUTPATIENT)
Age: 79
End: 2024-07-10

## 2024-07-10 PROCEDURE — 92012 INTRM OPH EXAM EST PATIENT: CPT | Mod: 24

## 2024-07-10 PROCEDURE — 66020 INJECTION TREATMENT OF EYE: CPT | Mod: 78,LT

## 2024-07-12 ENCOUNTER — APPOINTMENT (OUTPATIENT)
Dept: OPHTHALMOLOGY | Facility: CLINIC | Age: 79
End: 2024-07-12
Payer: MEDICARE

## 2024-07-12 ENCOUNTER — NON-APPOINTMENT (OUTPATIENT)
Age: 79
End: 2024-07-12

## 2024-07-12 PROCEDURE — 99024 POSTOP FOLLOW-UP VISIT: CPT

## 2024-07-16 ENCOUNTER — NON-APPOINTMENT (OUTPATIENT)
Age: 79
End: 2024-07-16

## 2024-07-16 ENCOUNTER — APPOINTMENT (OUTPATIENT)
Dept: OPHTHALMOLOGY | Facility: CLINIC | Age: 79
End: 2024-07-16
Payer: MEDICARE

## 2024-07-16 PROCEDURE — 66020 INJECTION TREATMENT OF EYE: CPT | Mod: 78,LT

## 2024-07-16 PROCEDURE — 99024 POSTOP FOLLOW-UP VISIT: CPT

## 2024-07-19 ENCOUNTER — APPOINTMENT (OUTPATIENT)
Dept: OPHTHALMOLOGY | Facility: CLINIC | Age: 79
End: 2024-07-19
Payer: MEDICARE

## 2024-07-19 ENCOUNTER — NON-APPOINTMENT (OUTPATIENT)
Age: 79
End: 2024-07-19

## 2024-07-19 PROCEDURE — 99024 POSTOP FOLLOW-UP VISIT: CPT

## 2024-07-23 ENCOUNTER — APPOINTMENT (OUTPATIENT)
Dept: OPHTHALMOLOGY | Facility: CLINIC | Age: 79
End: 2024-07-23
Payer: MEDICARE

## 2024-07-23 ENCOUNTER — NON-APPOINTMENT (OUTPATIENT)
Age: 79
End: 2024-07-23

## 2024-07-23 PROCEDURE — 99024 POSTOP FOLLOW-UP VISIT: CPT

## 2024-07-23 PROCEDURE — 66020 INJECTION TREATMENT OF EYE: CPT | Mod: 78,LT

## 2024-07-26 ENCOUNTER — APPOINTMENT (OUTPATIENT)
Dept: OPHTHALMOLOGY | Facility: CLINIC | Age: 79
End: 2024-07-26
Payer: MEDICARE

## 2024-07-26 ENCOUNTER — NON-APPOINTMENT (OUTPATIENT)
Age: 79
End: 2024-07-26

## 2024-07-26 PROCEDURE — 99024 POSTOP FOLLOW-UP VISIT: CPT

## 2024-07-30 ENCOUNTER — NON-APPOINTMENT (OUTPATIENT)
Age: 79
End: 2024-07-30

## 2024-07-30 ENCOUNTER — APPOINTMENT (OUTPATIENT)
Dept: OPHTHALMOLOGY | Facility: CLINIC | Age: 79
End: 2024-07-30
Payer: MEDICARE

## 2024-07-30 PROCEDURE — 99024 POSTOP FOLLOW-UP VISIT: CPT

## 2024-07-30 PROCEDURE — 66020 INJECTION TREATMENT OF EYE: CPT | Mod: 78,LT

## 2024-08-02 ENCOUNTER — APPOINTMENT (OUTPATIENT)
Dept: OPHTHALMOLOGY | Facility: CLINIC | Age: 79
End: 2024-08-02
Payer: MEDICARE

## 2024-08-02 ENCOUNTER — NON-APPOINTMENT (OUTPATIENT)
Age: 79
End: 2024-08-02

## 2024-08-02 PROCEDURE — 99024 POSTOP FOLLOW-UP VISIT: CPT

## 2024-08-02 PROCEDURE — 66020 INJECTION TREATMENT OF EYE: CPT | Mod: 78,LT

## 2024-08-06 ENCOUNTER — APPOINTMENT (OUTPATIENT)
Dept: OPHTHALMOLOGY | Facility: CLINIC | Age: 79
End: 2024-08-06

## 2024-08-06 ENCOUNTER — NON-APPOINTMENT (OUTPATIENT)
Age: 79
End: 2024-08-06

## 2024-08-06 PROCEDURE — 67820 REVISE EYELASHES: CPT | Mod: 79,LT

## 2024-08-06 PROCEDURE — 99024 POSTOP FOLLOW-UP VISIT: CPT

## 2024-08-09 ENCOUNTER — NON-APPOINTMENT (OUTPATIENT)
Age: 79
End: 2024-08-09

## 2024-08-09 ENCOUNTER — APPOINTMENT (OUTPATIENT)
Dept: OPHTHALMOLOGY | Facility: CLINIC | Age: 79
End: 2024-08-09

## 2024-08-09 PROCEDURE — 99024 POSTOP FOLLOW-UP VISIT: CPT

## 2024-08-09 PROCEDURE — 66020 INJECTION TREATMENT OF EYE: CPT | Mod: LT

## 2024-08-13 ENCOUNTER — APPOINTMENT (OUTPATIENT)
Dept: OPHTHALMOLOGY | Facility: CLINIC | Age: 79
End: 2024-08-13
Payer: MEDICARE

## 2024-08-13 ENCOUNTER — NON-APPOINTMENT (OUTPATIENT)
Age: 79
End: 2024-08-13

## 2024-08-13 PROCEDURE — 66020 INJECTION TREATMENT OF EYE: CPT | Mod: 78,LT

## 2024-08-13 PROCEDURE — 99024 POSTOP FOLLOW-UP VISIT: CPT

## 2024-08-13 PROCEDURE — 92285 EXTERNAL OCULAR PHOTOGRAPHY: CPT

## 2024-08-13 PROCEDURE — 65430 CORNEAL SMEAR: CPT | Mod: 78,LT

## 2024-08-14 ENCOUNTER — APPOINTMENT (OUTPATIENT)
Dept: OPHTHALMOLOGY | Facility: CLINIC | Age: 79
End: 2024-08-14
Payer: MEDICARE

## 2024-08-14 ENCOUNTER — NON-APPOINTMENT (OUTPATIENT)
Age: 79
End: 2024-08-14

## 2024-08-14 PROCEDURE — 99024 POSTOP FOLLOW-UP VISIT: CPT

## 2024-08-14 PROCEDURE — 66020 INJECTION TREATMENT OF EYE: CPT | Mod: 78,LT

## 2024-08-16 ENCOUNTER — NON-APPOINTMENT (OUTPATIENT)
Age: 79
End: 2024-08-16

## 2024-08-16 ENCOUNTER — APPOINTMENT (OUTPATIENT)
Dept: OPHTHALMOLOGY | Facility: CLINIC | Age: 79
End: 2024-08-16
Payer: MEDICARE

## 2024-08-16 PROCEDURE — 66020 INJECTION TREATMENT OF EYE: CPT | Mod: 59,LT

## 2024-08-16 PROCEDURE — 65778 COVER EYE W/MEMBRANE: CPT | Mod: LT

## 2024-08-16 PROCEDURE — 92012 INTRM OPH EXAM EST PATIENT: CPT | Mod: 25

## 2024-08-19 ENCOUNTER — NON-APPOINTMENT (OUTPATIENT)
Age: 79
End: 2024-08-19

## 2024-08-19 ENCOUNTER — APPOINTMENT (OUTPATIENT)
Dept: OPHTHALMOLOGY | Facility: CLINIC | Age: 79
End: 2024-08-19
Payer: MEDICARE

## 2024-08-19 PROCEDURE — 99024 POSTOP FOLLOW-UP VISIT: CPT

## 2024-08-19 PROCEDURE — 65778 COVER EYE W/MEMBRANE: CPT | Mod: 78,LT

## 2024-08-21 ENCOUNTER — APPOINTMENT (OUTPATIENT)
Dept: OPHTHALMOLOGY | Facility: CLINIC | Age: 79
End: 2024-08-21
Payer: MEDICARE

## 2024-08-21 ENCOUNTER — NON-APPOINTMENT (OUTPATIENT)
Age: 79
End: 2024-08-21

## 2024-08-21 PROCEDURE — 99024 POSTOP FOLLOW-UP VISIT: CPT

## 2024-08-23 ENCOUNTER — APPOINTMENT (OUTPATIENT)
Dept: OPHTHALMOLOGY | Facility: CLINIC | Age: 79
End: 2024-08-23
Payer: MEDICARE

## 2024-08-23 ENCOUNTER — NON-APPOINTMENT (OUTPATIENT)
Age: 79
End: 2024-08-23

## 2024-08-23 PROCEDURE — 99024 POSTOP FOLLOW-UP VISIT: CPT

## 2024-08-26 ENCOUNTER — NON-APPOINTMENT (OUTPATIENT)
Age: 79
End: 2024-08-26

## 2024-08-26 ENCOUNTER — APPOINTMENT (OUTPATIENT)
Dept: OPHTHALMOLOGY | Facility: CLINIC | Age: 79
End: 2024-08-26
Payer: MEDICARE

## 2024-08-26 PROCEDURE — 66020 INJECTION TREATMENT OF EYE: CPT | Mod: LT

## 2024-08-26 PROCEDURE — 92012 INTRM OPH EXAM EST PATIENT: CPT | Mod: 25

## 2024-08-28 ENCOUNTER — APPOINTMENT (OUTPATIENT)
Dept: OPHTHALMOLOGY | Facility: CLINIC | Age: 79
End: 2024-08-28

## 2024-08-28 ENCOUNTER — NON-APPOINTMENT (OUTPATIENT)
Age: 79
End: 2024-08-28

## 2024-08-28 PROCEDURE — 65778 COVER EYE W/MEMBRANE: CPT | Mod: 78,LT

## 2024-08-28 PROCEDURE — 92012 INTRM OPH EXAM EST PATIENT: CPT | Mod: 24,25

## 2024-08-28 PROCEDURE — 66020 INJECTION TREATMENT OF EYE: CPT | Mod: 78,59,LT

## 2024-08-30 ENCOUNTER — NON-APPOINTMENT (OUTPATIENT)
Age: 79
End: 2024-08-30

## 2024-08-30 ENCOUNTER — APPOINTMENT (OUTPATIENT)
Dept: OPHTHALMOLOGY | Facility: CLINIC | Age: 79
End: 2024-08-30
Payer: MEDICARE

## 2024-08-30 PROCEDURE — 92012 INTRM OPH EXAM EST PATIENT: CPT | Mod: 25

## 2024-08-30 PROCEDURE — 66020 INJECTION TREATMENT OF EYE: CPT | Mod: LT

## 2024-09-03 ENCOUNTER — NON-APPOINTMENT (OUTPATIENT)
Age: 79
End: 2024-09-03

## 2024-09-03 ENCOUNTER — APPOINTMENT (OUTPATIENT)
Dept: OPHTHALMOLOGY | Facility: CLINIC | Age: 79
End: 2024-09-03
Payer: MEDICARE

## 2024-09-03 PROCEDURE — 66020 INJECTION TREATMENT OF EYE: CPT | Mod: 78,LT

## 2024-09-03 PROCEDURE — 92012 INTRM OPH EXAM EST PATIENT: CPT | Mod: 24,25

## 2024-09-04 ENCOUNTER — NON-APPOINTMENT (OUTPATIENT)
Age: 79
End: 2024-09-04

## 2024-09-04 ENCOUNTER — APPOINTMENT (OUTPATIENT)
Dept: OPHTHALMOLOGY | Facility: CLINIC | Age: 79
End: 2024-09-04
Payer: MEDICARE

## 2024-09-04 PROCEDURE — 92012 INTRM OPH EXAM EST PATIENT: CPT | Mod: 24,25

## 2024-09-04 PROCEDURE — 66020 INJECTION TREATMENT OF EYE: CPT | Mod: 78,LT

## 2024-09-04 PROCEDURE — 65778 COVER EYE W/MEMBRANE: CPT | Mod: 78,LT

## 2024-09-06 ENCOUNTER — APPOINTMENT (OUTPATIENT)
Dept: OPHTHALMOLOGY | Facility: CLINIC | Age: 79
End: 2024-09-06
Payer: MEDICARE

## 2024-09-06 ENCOUNTER — NON-APPOINTMENT (OUTPATIENT)
Age: 79
End: 2024-09-06

## 2024-09-06 PROCEDURE — 92012 INTRM OPH EXAM EST PATIENT: CPT | Mod: 24,25

## 2024-09-06 PROCEDURE — 66020 INJECTION TREATMENT OF EYE: CPT | Mod: 78,LT

## 2024-09-10 ENCOUNTER — APPOINTMENT (OUTPATIENT)
Dept: OPHTHALMOLOGY | Facility: CLINIC | Age: 79
End: 2024-09-10
Payer: MEDICARE

## 2024-09-10 ENCOUNTER — NON-APPOINTMENT (OUTPATIENT)
Age: 79
End: 2024-09-10

## 2024-09-10 PROCEDURE — 92012 INTRM OPH EXAM EST PATIENT: CPT | Mod: 25

## 2024-09-10 PROCEDURE — 92285 EXTERNAL OCULAR PHOTOGRAPHY: CPT

## 2024-09-10 PROCEDURE — 66020 INJECTION TREATMENT OF EYE: CPT | Mod: LT

## 2024-09-11 ENCOUNTER — NON-APPOINTMENT (OUTPATIENT)
Age: 79
End: 2024-09-11

## 2024-09-11 ENCOUNTER — APPOINTMENT (OUTPATIENT)
Dept: OPHTHALMOLOGY | Facility: CLINIC | Age: 79
End: 2024-09-11
Payer: MEDICARE

## 2024-09-11 PROCEDURE — 92012 INTRM OPH EXAM EST PATIENT: CPT | Mod: 25

## 2024-09-11 PROCEDURE — 65778 COVER EYE W/MEMBRANE: CPT | Mod: 78,LT

## 2024-09-11 PROCEDURE — 66020 INJECTION TREATMENT OF EYE: CPT | Mod: 78,LT

## 2024-09-13 ENCOUNTER — APPOINTMENT (OUTPATIENT)
Dept: OPHTHALMOLOGY | Facility: CLINIC | Age: 79
End: 2024-09-13

## 2024-09-13 ENCOUNTER — NON-APPOINTMENT (OUTPATIENT)
Age: 79
End: 2024-09-13

## 2024-09-13 PROCEDURE — 66020 INJECTION TREATMENT OF EYE: CPT | Mod: 78,LT

## 2024-09-13 PROCEDURE — 92285 EXTERNAL OCULAR PHOTOGRAPHY: CPT

## 2024-09-13 PROCEDURE — 92012 INTRM OPH EXAM EST PATIENT: CPT | Mod: 25

## 2024-09-17 ENCOUNTER — NON-APPOINTMENT (OUTPATIENT)
Age: 79
End: 2024-09-17

## 2024-09-17 ENCOUNTER — APPOINTMENT (OUTPATIENT)
Dept: OPHTHALMOLOGY | Facility: CLINIC | Age: 79
End: 2024-09-17

## 2024-09-17 PROCEDURE — 99024 POSTOP FOLLOW-UP VISIT: CPT

## 2024-09-17 PROCEDURE — 66020 INJECTION TREATMENT OF EYE: CPT | Mod: 78,LT

## 2024-09-18 ENCOUNTER — APPOINTMENT (OUTPATIENT)
Dept: OPHTHALMOLOGY | Facility: CLINIC | Age: 79
End: 2024-09-18

## 2024-09-18 ENCOUNTER — NON-APPOINTMENT (OUTPATIENT)
Age: 79
End: 2024-09-18

## 2024-09-18 PROCEDURE — 99024 POSTOP FOLLOW-UP VISIT: CPT

## 2024-09-18 PROCEDURE — 65778 COVER EYE W/MEMBRANE: CPT | Mod: 78,LT

## 2024-09-18 PROCEDURE — 66020 INJECTION TREATMENT OF EYE: CPT | Mod: 59,78,LT

## 2024-09-20 ENCOUNTER — APPOINTMENT (OUTPATIENT)
Dept: OPHTHALMOLOGY | Facility: CLINIC | Age: 79
End: 2024-09-20

## 2024-09-20 ENCOUNTER — NON-APPOINTMENT (OUTPATIENT)
Age: 79
End: 2024-09-20

## 2024-09-20 PROCEDURE — 99024 POSTOP FOLLOW-UP VISIT: CPT

## 2024-09-20 PROCEDURE — 92285 EXTERNAL OCULAR PHOTOGRAPHY: CPT

## 2024-09-20 PROCEDURE — 66020 INJECTION TREATMENT OF EYE: CPT | Mod: 78,LT

## 2024-09-24 ENCOUNTER — APPOINTMENT (OUTPATIENT)
Dept: OPHTHALMOLOGY | Facility: CLINIC | Age: 79
End: 2024-09-24

## 2024-09-24 ENCOUNTER — NON-APPOINTMENT (OUTPATIENT)
Age: 79
End: 2024-09-24

## 2024-09-24 PROCEDURE — 66020 INJECTION TREATMENT OF EYE: CPT | Mod: 78,LT

## 2024-09-24 PROCEDURE — 67820 REVISE EYELASHES: CPT | Mod: 78,LT

## 2024-09-24 PROCEDURE — 92012 INTRM OPH EXAM EST PATIENT: CPT | Mod: 24,25

## 2024-09-25 ENCOUNTER — APPOINTMENT (OUTPATIENT)
Dept: OPHTHALMOLOGY | Facility: CLINIC | Age: 79
End: 2024-09-25

## 2024-09-25 ENCOUNTER — NON-APPOINTMENT (OUTPATIENT)
Age: 79
End: 2024-09-25

## 2024-09-25 PROCEDURE — 66020 INJECTION TREATMENT OF EYE: CPT | Mod: 78,LT

## 2024-09-25 PROCEDURE — 92285 EXTERNAL OCULAR PHOTOGRAPHY: CPT

## 2024-09-25 PROCEDURE — 92012 INTRM OPH EXAM EST PATIENT: CPT | Mod: 24,25

## 2024-09-27 ENCOUNTER — NON-APPOINTMENT (OUTPATIENT)
Age: 79
End: 2024-09-27

## 2024-09-27 ENCOUNTER — APPOINTMENT (OUTPATIENT)
Dept: OPHTHALMOLOGY | Facility: CLINIC | Age: 79
End: 2024-09-27

## 2024-09-27 PROCEDURE — 66020 INJECTION TREATMENT OF EYE: CPT | Mod: 59,LT

## 2024-09-27 PROCEDURE — 65778 COVER EYE W/MEMBRANE: CPT | Mod: LT

## 2024-09-27 PROCEDURE — 92012 INTRM OPH EXAM EST PATIENT: CPT | Mod: 25

## 2024-10-01 ENCOUNTER — APPOINTMENT (OUTPATIENT)
Dept: OPHTHALMOLOGY | Facility: CLINIC | Age: 79
End: 2024-10-01
Payer: MEDICARE

## 2024-10-01 ENCOUNTER — NON-APPOINTMENT (OUTPATIENT)
Age: 79
End: 2024-10-01

## 2024-10-01 PROCEDURE — 92012 INTRM OPH EXAM EST PATIENT: CPT

## 2024-10-04 ENCOUNTER — NON-APPOINTMENT (OUTPATIENT)
Age: 79
End: 2024-10-04

## 2024-10-04 ENCOUNTER — APPOINTMENT (OUTPATIENT)
Dept: OPHTHALMOLOGY | Facility: CLINIC | Age: 79
End: 2024-10-04
Payer: MEDICARE

## 2024-10-04 PROCEDURE — 92012 INTRM OPH EXAM EST PATIENT: CPT

## 2024-10-09 ENCOUNTER — APPOINTMENT (OUTPATIENT)
Dept: OPHTHALMOLOGY | Facility: CLINIC | Age: 79
End: 2024-10-09
Payer: MEDICARE

## 2024-10-09 ENCOUNTER — NON-APPOINTMENT (OUTPATIENT)
Age: 79
End: 2024-10-09

## 2024-10-09 PROCEDURE — 92285 EXTERNAL OCULAR PHOTOGRAPHY: CPT

## 2024-10-09 PROCEDURE — 99215 OFFICE O/P EST HI 40 MIN: CPT

## 2024-10-16 ENCOUNTER — APPOINTMENT (OUTPATIENT)
Dept: OPHTHALMOLOGY | Facility: CLINIC | Age: 79
End: 2024-10-16
Payer: MEDICARE

## 2024-10-16 ENCOUNTER — NON-APPOINTMENT (OUTPATIENT)
Age: 79
End: 2024-10-16

## 2024-10-16 PROCEDURE — 92134 CPTRZ OPH DX IMG PST SGM RTA: CPT

## 2024-10-16 PROCEDURE — 92014 COMPRE OPH EXAM EST PT 1/>: CPT

## 2024-10-25 ENCOUNTER — NON-APPOINTMENT (OUTPATIENT)
Age: 79
End: 2024-10-25

## 2024-10-25 ENCOUNTER — APPOINTMENT (OUTPATIENT)
Dept: OPHTHALMOLOGY | Facility: CLINIC | Age: 79
End: 2024-10-25
Payer: MEDICARE

## 2024-10-25 PROCEDURE — 92012 INTRM OPH EXAM EST PATIENT: CPT | Mod: 25

## 2024-10-25 PROCEDURE — 65778 COVER EYE W/MEMBRANE: CPT | Mod: LT

## 2024-10-30 ENCOUNTER — APPOINTMENT (OUTPATIENT)
Dept: OPHTHALMOLOGY | Facility: CLINIC | Age: 79
End: 2024-10-30
Payer: MEDICARE

## 2024-10-30 ENCOUNTER — NON-APPOINTMENT (OUTPATIENT)
Age: 79
End: 2024-10-30

## 2024-10-30 PROCEDURE — 92012 INTRM OPH EXAM EST PATIENT: CPT

## 2024-11-13 ENCOUNTER — NON-APPOINTMENT (OUTPATIENT)
Age: 79
End: 2024-11-13

## 2024-11-13 ENCOUNTER — APPOINTMENT (OUTPATIENT)
Dept: OPHTHALMOLOGY | Facility: CLINIC | Age: 79
End: 2024-11-13
Payer: MEDICARE

## 2024-11-13 PROCEDURE — 92012 INTRM OPH EXAM EST PATIENT: CPT

## 2024-11-26 ENCOUNTER — APPOINTMENT (OUTPATIENT)
Dept: OPHTHALMOLOGY | Facility: CLINIC | Age: 79
End: 2024-11-26
Payer: MEDICARE

## 2024-11-26 ENCOUNTER — NON-APPOINTMENT (OUTPATIENT)
Age: 79
End: 2024-11-26

## 2024-11-26 PROCEDURE — 66020 INJECTION TREATMENT OF EYE: CPT | Mod: LT

## 2024-11-26 PROCEDURE — 92012 INTRM OPH EXAM EST PATIENT: CPT | Mod: 25

## 2024-11-26 PROCEDURE — 92285 EXTERNAL OCULAR PHOTOGRAPHY: CPT

## 2024-11-27 ENCOUNTER — APPOINTMENT (OUTPATIENT)
Dept: OPHTHALMOLOGY | Facility: CLINIC | Age: 79
End: 2024-11-27
Payer: MEDICARE

## 2024-11-27 ENCOUNTER — NON-APPOINTMENT (OUTPATIENT)
Age: 79
End: 2024-11-27

## 2024-11-27 PROCEDURE — 92012 INTRM OPH EXAM EST PATIENT: CPT | Mod: 24,25

## 2024-11-27 PROCEDURE — 66020 INJECTION TREATMENT OF EYE: CPT | Mod: 78,LT

## 2024-12-03 ENCOUNTER — NON-APPOINTMENT (OUTPATIENT)
Age: 79
End: 2024-12-03

## 2024-12-03 ENCOUNTER — APPOINTMENT (OUTPATIENT)
Dept: OPHTHALMOLOGY | Facility: CLINIC | Age: 79
End: 2024-12-03
Payer: MEDICARE

## 2024-12-03 PROCEDURE — 66020 INJECTION TREATMENT OF EYE: CPT | Mod: 78,LT

## 2024-12-03 PROCEDURE — 99024 POSTOP FOLLOW-UP VISIT: CPT

## 2024-12-05 ENCOUNTER — APPOINTMENT (OUTPATIENT)
Dept: OPHTHALMOLOGY | Facility: CLINIC | Age: 79
End: 2024-12-05
Payer: MEDICARE

## 2024-12-05 ENCOUNTER — NON-APPOINTMENT (OUTPATIENT)
Age: 79
End: 2024-12-05

## 2024-12-05 PROCEDURE — 99024 POSTOP FOLLOW-UP VISIT: CPT

## 2024-12-05 PROCEDURE — 66020 INJECTION TREATMENT OF EYE: CPT | Mod: 78,LT

## 2024-12-10 ENCOUNTER — APPOINTMENT (OUTPATIENT)
Dept: OPHTHALMOLOGY | Facility: AMBULATORY SURGERY CENTER | Age: 79
End: 2024-12-10

## 2024-12-13 ENCOUNTER — APPOINTMENT (OUTPATIENT)
Dept: OPHTHALMOLOGY | Facility: AMBULATORY SURGERY CENTER | Age: 79
End: 2024-12-13

## 2024-12-17 ENCOUNTER — APPOINTMENT (OUTPATIENT)
Dept: OPHTHALMOLOGY | Facility: CLINIC | Age: 79
End: 2024-12-17

## 2024-12-19 NOTE — ASU PATIENT PROFILE, ADULT - ABLE TO REACH PT
VM  instructions left on phone . pt to be NPO post NM/ wear loose fitting comfortable clothes/ no lotions/ perfume/ jewelry or make up/no VM  instructions left on phone . pt to be NPO post NM/ wear loose fitting comfortable clothes/ no lotions/ perfume/ jewelry or make up/yes

## 2024-12-19 NOTE — ASU PATIENT PROFILE, ADULT - NSICDXPASTSURGICALHX_GEN_ALL_CORE_FT
PAST SURGICAL HISTORY:  Closed fracture of sesamoid bone of left hand, initial encounter     Corneal transplant failure, left eye     History of repair of hip joint     Lung mass     Post corneal transplant left
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
## 2024-12-19 NOTE — ASU PATIENT PROFILE, ADULT - FALL HARM RISK - HARM RISK INTERVENTIONS

## 2024-12-19 NOTE — ASU PATIENT PROFILE, ADULT - NS PREOP UNDERSTANDS INFO
Spoke to patient . Be NPO/NO  after 12MN, dress comfortable , no lotion no jewelry, Bring ID photo  and insurance  cards, escort arranged  with family , address and telephone given/yes

## 2024-12-20 ENCOUNTER — OUTPATIENT (OUTPATIENT)
Dept: OUTPATIENT SERVICES | Facility: HOSPITAL | Age: 79
LOS: 1 days | Discharge: HOME | End: 2024-12-20
Payer: MEDICARE

## 2024-12-20 ENCOUNTER — APPOINTMENT (OUTPATIENT)
Dept: OPHTHALMOLOGY | Facility: AMBULATORY SURGERY CENTER | Age: 79
End: 2024-12-20

## 2024-12-20 ENCOUNTER — TRANSCRIPTION ENCOUNTER (OUTPATIENT)
Age: 79
End: 2024-12-20

## 2024-12-20 VITALS
TEMPERATURE: 98 F | RESPIRATION RATE: 16 BRPM | HEART RATE: 68 BPM | DIASTOLIC BLOOD PRESSURE: 65 MMHG | OXYGEN SATURATION: 98 % | SYSTOLIC BLOOD PRESSURE: 117 MMHG

## 2024-12-20 VITALS
SYSTOLIC BLOOD PRESSURE: 106 MMHG | HEART RATE: 63 BPM | TEMPERATURE: 99 F | DIASTOLIC BLOOD PRESSURE: 59 MMHG | RESPIRATION RATE: 16 BRPM | OXYGEN SATURATION: 97 % | WEIGHT: 85.1 LBS | HEIGHT: 64 IN

## 2024-12-20 DIAGNOSIS — R91.8 OTHER NONSPECIFIC ABNORMAL FINDING OF LUNG FIELD: Chronic | ICD-10-CM

## 2024-12-20 DIAGNOSIS — Z98.890 OTHER SPECIFIED POSTPROCEDURAL STATES: Chronic | ICD-10-CM

## 2024-12-20 DIAGNOSIS — Z94.7 CORNEAL TRANSPLANT STATUS: Chronic | ICD-10-CM

## 2024-12-20 DIAGNOSIS — S62.102A FRACTURE OF UNSPECIFIED CARPAL BONE, LEFT WRIST, INITIAL ENCOUNTER FOR CLOSED FRACTURE: Chronic | ICD-10-CM

## 2024-12-20 DIAGNOSIS — T86.8412 CORNEAL TRANSPLANT FAILURE, LEFT EYE: Chronic | ICD-10-CM

## 2024-12-20 PROCEDURE — 65093 REVISE EYE WITH IMPLANT: CPT | Mod: LT

## 2024-12-20 PROCEDURE — 88305 TISSUE EXAM BY PATHOLOGIST: CPT | Mod: 26

## 2024-12-20 PROCEDURE — 88342 IMHCHEM/IMCYTCHM 1ST ANTB: CPT | Mod: 26

## 2024-12-20 PROCEDURE — 88313 SPECIAL STAINS GROUP 2: CPT | Mod: 26

## 2024-12-20 PROCEDURE — 88312 SPECIAL STAINS GROUP 1: CPT | Mod: 26

## 2024-12-20 PROCEDURE — 67875 CLOSURE OF EYELID BY SUTURE: CPT | Mod: LT

## 2024-12-20 PROCEDURE — 88300 SURGICAL PATH GROSS: CPT | Mod: 26

## 2024-12-20 DEVICE — IMP OCULAR SPHERE CNFRM MED  H: Type: IMPLANTABLE DEVICE | Site: LEFT | Status: FUNCTIONAL

## 2024-12-20 DEVICE — IMPLANTABLE DEVICE: Type: IMPLANTABLE DEVICE | Site: LEFT | Status: FUNCTIONAL

## 2024-12-20 RX ORDER — ETHYL ALCOHOL/D5W 5 %
5 INTRAVENOUS SOLUTION INTRAVENOUS ONCE
Refills: 0 | Status: DISCONTINUED | OUTPATIENT
Start: 2024-12-20 | End: 2024-12-20

## 2024-12-20 RX ORDER — DEXAMETHASONE 1.5 MG/1
6 TABLET ORAL ONCE
Refills: 0 | Status: COMPLETED | OUTPATIENT
Start: 2024-12-20 | End: 2024-12-20

## 2024-12-20 RX ORDER — ONDANSETRON HYDROCHLORIDE 4 MG/1
4 TABLET, FILM COATED ORAL ONCE
Refills: 0 | Status: DISCONTINUED | OUTPATIENT
Start: 2024-12-20 | End: 2024-12-20

## 2024-12-20 RX ORDER — OXYCODONE HYDROCHLORIDE 30 MG/1
5 TABLET ORAL ONCE
Refills: 0 | Status: DISCONTINUED | OUTPATIENT
Start: 2024-12-20 | End: 2024-12-20

## 2024-12-20 RX ORDER — KETOROLAC TROMETHAMINE 30 MG/ML
15 INJECTION INTRAMUSCULAR; INTRAVENOUS ONCE
Refills: 0 | Status: DISCONTINUED | OUTPATIENT
Start: 2024-12-20 | End: 2024-12-20

## 2024-12-20 RX ORDER — 0.9 % SODIUM CHLORIDE 0.9 %
1000 INTRAVENOUS SOLUTION INTRAVENOUS
Refills: 0 | Status: DISCONTINUED | OUTPATIENT
Start: 2024-12-20 | End: 2024-12-20

## 2024-12-20 RX ORDER — ACETAMINOPHEN 500MG 500 MG/1
650 TABLET, COATED ORAL ONCE
Refills: 0 | Status: COMPLETED | OUTPATIENT
Start: 2024-12-20 | End: 2024-12-20

## 2024-12-20 RX ADMIN — KETOROLAC TROMETHAMINE 15 MILLIGRAM(S): 30 INJECTION INTRAMUSCULAR; INTRAVENOUS at 17:46

## 2024-12-20 RX ADMIN — Medication 100 MILLILITER(S): at 15:47

## 2024-12-20 RX ADMIN — ACETAMINOPHEN 500MG 650 MILLIGRAM(S): 500 TABLET, COATED ORAL at 16:11

## 2024-12-20 RX ADMIN — ACETAMINOPHEN 500MG 650 MILLIGRAM(S): 500 TABLET, COATED ORAL at 15:41

## 2024-12-20 RX ADMIN — OXYCODONE HYDROCHLORIDE 5 MILLIGRAM(S): 30 TABLET ORAL at 16:45

## 2024-12-20 RX ADMIN — OXYCODONE HYDROCHLORIDE 5 MILLIGRAM(S): 30 TABLET ORAL at 16:17

## 2024-12-20 RX ADMIN — KETOROLAC TROMETHAMINE 15 MILLIGRAM(S): 30 INJECTION INTRAMUSCULAR; INTRAVENOUS at 18:01

## 2024-12-20 RX ADMIN — DEXAMETHASONE 6 MILLIGRAM(S): 1.5 TABLET ORAL at 18:11

## 2024-12-20 RX ADMIN — Medication 100 MILLILITER(S): at 16:32

## 2024-12-20 NOTE — PRE-ANESTHESIA EVALUATION ADULT - NSANTHPEFT_GEN_ALL_CORE
baseline respiratory effort, hemodynamically stable baseline respiratory effort not on O2, hemodynamically stable

## 2025-01-02 ENCOUNTER — APPOINTMENT (OUTPATIENT)
Dept: OPHTHALMOLOGY | Facility: CLINIC | Age: 80
End: 2025-01-02
Payer: MEDICARE

## 2025-01-02 ENCOUNTER — NON-APPOINTMENT (OUTPATIENT)
Age: 80
End: 2025-01-02

## 2025-01-02 PROCEDURE — 99024 POSTOP FOLLOW-UP VISIT: CPT

## 2025-01-30 ENCOUNTER — APPOINTMENT (OUTPATIENT)
Dept: OPHTHALMOLOGY | Facility: CLINIC | Age: 80
End: 2025-01-30
Payer: MEDICARE

## 2025-01-30 ENCOUNTER — NON-APPOINTMENT (OUTPATIENT)
Age: 80
End: 2025-01-30

## 2025-01-30 PROCEDURE — 99024 POSTOP FOLLOW-UP VISIT: CPT

## 2025-02-03 ENCOUNTER — APPOINTMENT (OUTPATIENT)
Dept: OPHTHALMOLOGY | Facility: CLINIC | Age: 80
End: 2025-02-03
Payer: MEDICARE

## 2025-02-03 ENCOUNTER — NON-APPOINTMENT (OUTPATIENT)
Age: 80
End: 2025-02-03

## 2025-02-03 PROCEDURE — 92014 COMPRE OPH EXAM EST PT 1/>: CPT | Mod: 24

## 2025-02-03 PROCEDURE — 92134 CPTRZ OPH DX IMG PST SGM RTA: CPT

## 2025-02-26 ENCOUNTER — NON-APPOINTMENT (OUTPATIENT)
Age: 80
End: 2025-02-26

## 2025-02-26 ENCOUNTER — APPOINTMENT (OUTPATIENT)
Dept: OPHTHALMOLOGY | Facility: CLINIC | Age: 80
End: 2025-02-26
Payer: MEDICARE

## 2025-02-26 PROCEDURE — 92285 EXTERNAL OCULAR PHOTOGRAPHY: CPT | Mod: LT

## 2025-02-26 PROCEDURE — 99024 POSTOP FOLLOW-UP VISIT: CPT

## 2025-02-28 ENCOUNTER — APPOINTMENT (OUTPATIENT)
Dept: OPHTHALMOLOGY | Facility: CLINIC | Age: 80
End: 2025-02-28
Payer: MEDICARE

## 2025-02-28 ENCOUNTER — NON-APPOINTMENT (OUTPATIENT)
Age: 80
End: 2025-02-28

## 2025-02-28 PROCEDURE — 92012 INTRM OPH EXAM EST PATIENT: CPT | Mod: 24

## 2025-03-26 ENCOUNTER — NON-APPOINTMENT (OUTPATIENT)
Age: 80
End: 2025-03-26

## 2025-03-26 ENCOUNTER — APPOINTMENT (OUTPATIENT)
Dept: OPHTHALMOLOGY | Facility: CLINIC | Age: 80
End: 2025-03-26
Payer: MEDICARE

## 2025-03-26 PROCEDURE — 92012 INTRM OPH EXAM EST PATIENT: CPT

## 2025-05-28 ENCOUNTER — NON-APPOINTMENT (OUTPATIENT)
Age: 80
End: 2025-05-28

## 2025-05-28 ENCOUNTER — APPOINTMENT (OUTPATIENT)
Dept: OPHTHALMOLOGY | Facility: CLINIC | Age: 80
End: 2025-05-28
Payer: MEDICARE

## 2025-05-28 PROCEDURE — 99214 OFFICE O/P EST MOD 30 MIN: CPT

## 2025-06-05 ENCOUNTER — APPOINTMENT (OUTPATIENT)
Dept: OPHTHALMOLOGY | Facility: CLINIC | Age: 80
End: 2025-06-05
Payer: MEDICARE

## 2025-06-05 ENCOUNTER — NON-APPOINTMENT (OUTPATIENT)
Age: 80
End: 2025-06-05

## 2025-06-05 PROCEDURE — 92134 CPTRZ OPH DX IMG PST SGM RTA: CPT

## 2025-06-05 PROCEDURE — 92012 INTRM OPH EXAM EST PATIENT: CPT

## 2025-07-02 ENCOUNTER — APPOINTMENT (OUTPATIENT)
Dept: OPHTHALMOLOGY | Facility: CLINIC | Age: 80
End: 2025-07-02
Payer: MEDICARE

## 2025-07-02 ENCOUNTER — NON-APPOINTMENT (OUTPATIENT)
Age: 80
End: 2025-07-02

## 2025-07-02 PROCEDURE — 92012 INTRM OPH EXAM EST PATIENT: CPT

## 2025-07-23 ENCOUNTER — NON-APPOINTMENT (OUTPATIENT)
Age: 80
End: 2025-07-23

## 2025-07-23 ENCOUNTER — APPOINTMENT (OUTPATIENT)
Dept: OPHTHALMOLOGY | Facility: CLINIC | Age: 80
End: 2025-07-23

## 2025-07-23 PROCEDURE — 99213 OFFICE O/P EST LOW 20 MIN: CPT

## (undated) DEVICE — TIP BACKFLUSH SOFT DISP 23GA

## (undated) DEVICE — SYR LUER LOK 3CC

## (undated) DEVICE — Device

## (undated) DEVICE — SYR LUER LOK 10CC

## (undated) DEVICE — NDL RETROBULBAR VISITEC 25X1.5

## (undated) DEVICE — GLV 8 PROTEXIS (WHITE)

## (undated) DEVICE — MARKING PEN W RULER

## (undated) DEVICE — NDL FILTER TW NOKOR 19GA 1.5"

## (undated) DEVICE — SYR LUER LOK 5CC

## (undated) DEVICE — PACK ANTERIOR SEGMENT

## (undated) DEVICE — CANNULA MEDONE DUAL BORE SIDEFLO 25G

## (undated) DEVICE — PETRI DISH MED 3.5"

## (undated) DEVICE — VENODYNE/SCD SLEEVE CALF MEDIUM

## (undated) DEVICE — NDL HYPO SAFE 25G X 5/8" (ORANGE)

## (undated) DEVICE — DRAPE MICROSCOPE KNOB COVER SMALL (2 PCS)

## (undated) DEVICE — CONSTELLATION VFC PAK

## (undated) DEVICE — ILM FORCEP 25G PLUS

## (undated) DEVICE — CULTURESWAB WITHOUT CHARCOAL

## (undated) DEVICE — PACK VITRECTOMY  LF

## (undated) DEVICE — DRAPE MEDIUM SHEET 44" X 70"

## (undated) DEVICE — VACUUM CORNEAL PUNCH 8.75 MM

## (undated) DEVICE — SUT POLYSORB 8-0 5" MV-135-5

## (undated) DEVICE — SOL IRR BAL SALT 500ML

## (undated) DEVICE — BACKFLUSH SOFT TIP 25G

## (undated) DEVICE — PICK MICRO .5MM 25G

## (undated) DEVICE — SUT NYLON 10-0 12" CU-5

## (undated) DEVICE — KNIFE ALCON STANDARD FULL HANDLE 30 DEG (PINK)

## (undated) DEVICE — KNIFE ALCON MVR V-LANCE 23G (BLACK)

## (undated) DEVICE — GLV 7.5 PROTEXIS (WHITE)

## (undated) DEVICE — SCRPR MEM DMD DUSTED 25G

## (undated) DEVICE — SUT VICRYL 8-0 5" BV130-5

## (undated) DEVICE — APPLICATOR Q TIP 6" WOOD STEM

## (undated) DEVICE — BLADE TREPH BRRN-RAD VAC 8.5MM

## (undated) DEVICE — WARMING BLANKET LOWER ADULT

## (undated) DEVICE — BLADE TREPH BRRN-RAD VAC 9MM

## (undated) DEVICE — SPEAR CELLULOSE 40410

## (undated) DEVICE — FILTER SYR 22 MICRONS

## (undated) DEVICE — ELCTR ERASER BI-P BVL 45DEG 18G

## (undated) DEVICE — PICK MICRO .6MM 23G

## (undated) DEVICE — ELCTR WETFIELD ERASER FINE TIP 25GA

## (undated) DEVICE — MILLEX HA 45UM

## (undated) DEVICE — KNIFE ALCON MVR V-LANCE 20G (WHITE)

## (undated) DEVICE — PACK VITRECTOMY BEVEL 25G PLUS 10K

## (undated) DEVICE — SUT NYLON 5-0 18" R-5

## (undated) DEVICE — BIPOLAR FORCEP KIRWAN CUSHING 6" X 1MM W 12FT CORD (GREEN)

## (undated) DEVICE — SUT VICRYL 7-0 18" TG160-8 DA

## (undated) DEVICE — FORCEP GRIESHABER MAXGRIP 25GA DISP

## (undated) DEVICE — SUT VICRYL 6-0 18" P-3 UNDYED

## (undated) DEVICE — MARKING PEN DEVON DUAL TIP W RULER

## (undated) DEVICE — SUT PROLENE 5-0 36" RB-1

## (undated) DEVICE — SUT CHROMIC 6-0 18" G-1

## (undated) DEVICE — BLADE OPTH SCLER MULT SIDE SHARP

## (undated) DEVICE — SOL IRR SALT BSS PLUS 500ML

## (undated) DEVICE — PUNCH TREPH 9MM DISP STRL

## (undated) DEVICE — ILM FORCEP 25G

## (undated) DEVICE — INSTR GRIESHABER REVOLUTION SCISSOR 25G (CURVED) DISP

## (undated) DEVICE — CANNULA SURGICAL SPECIALTIES DUAL BORE 25G

## (undated) DEVICE — CANNULA .6MM 23G

## (undated) DEVICE — SUT VICRYL 7-0 12" TG140-8 DA

## (undated) DEVICE — APPLICATOR COTTON TIP 6"

## (undated) DEVICE — PICK ILLUMINATED 25G